# Patient Record
Sex: MALE | Race: ASIAN | URBAN - METROPOLITAN AREA
[De-identification: names, ages, dates, MRNs, and addresses within clinical notes are randomized per-mention and may not be internally consistent; named-entity substitution may affect disease eponyms.]

---

## 2022-01-31 ENCOUNTER — OFFICE VISIT (OUTPATIENT)
Dept: FAMILY MEDICINE CLINIC | Facility: CLINIC | Age: 47
End: 2022-01-31
Payer: COMMERCIAL

## 2022-01-31 VITALS
HEART RATE: 70 BPM | TEMPERATURE: 98 F | DIASTOLIC BLOOD PRESSURE: 78 MMHG | SYSTOLIC BLOOD PRESSURE: 116 MMHG | OXYGEN SATURATION: 99 % | RESPIRATION RATE: 18 BRPM

## 2022-01-31 DIAGNOSIS — G47.09 OTHER INSOMNIA: ICD-10-CM

## 2022-01-31 DIAGNOSIS — F32.A ANXIETY AND DEPRESSION: ICD-10-CM

## 2022-01-31 DIAGNOSIS — G47.33 OSA (OBSTRUCTIVE SLEEP APNEA): Primary | ICD-10-CM

## 2022-01-31 DIAGNOSIS — F41.9 ANXIETY AND DEPRESSION: ICD-10-CM

## 2022-01-31 DIAGNOSIS — L72.3 SEBACEOUS CYST: ICD-10-CM

## 2022-01-31 DIAGNOSIS — Z00.00 GENERAL MEDICAL EXAM: ICD-10-CM

## 2022-01-31 DIAGNOSIS — D17.21 LIPOMA OF RIGHT UPPER EXTREMITY: ICD-10-CM

## 2022-01-31 PROCEDURE — 3074F SYST BP LT 130 MM HG: CPT | Performed by: FAMILY MEDICINE

## 2022-01-31 PROCEDURE — 99203 OFFICE O/P NEW LOW 30 MIN: CPT | Performed by: FAMILY MEDICINE

## 2022-01-31 PROCEDURE — 3078F DIAST BP <80 MM HG: CPT | Performed by: FAMILY MEDICINE

## 2022-01-31 NOTE — PROGRESS NOTES
HPI:   Meg Naiud is a 51year old male who presents to establish     Pt has RICHARD - lost 15 lb / still not sleeping well and now using machine   + nocturia   Cannot fall asleep once he wakes up   Takes melatonin and magnesium     Pt has been feeling anxi normal mood and affect good eye contact appropriate     ASSESSMENT AND PLAN:   Indigo Emmanuel is a 51year old male who presents with     1. RICHARD (obstructive sleep apnea)    - OP REFERRAL TO DIAGNOSTIC SLEEP STUDY  - GENERAL SLEEP STUDY TRANSCRIPTION;  Sravanthi

## 2022-02-14 ENCOUNTER — OFFICE VISIT (OUTPATIENT)
Dept: SLEEP CENTER | Age: 47
End: 2022-02-14
Attending: INTERNAL MEDICINE
Payer: COMMERCIAL

## 2022-02-14 DIAGNOSIS — G47.33 OSA (OBSTRUCTIVE SLEEP APNEA): ICD-10-CM

## 2022-02-14 PROCEDURE — 95806 SLEEP STUDY UNATT&RESP EFFT: CPT

## 2022-02-15 ENCOUNTER — OFFICE VISIT (OUTPATIENT)
Dept: SLEEP CENTER | Age: 47
End: 2022-02-15
Attending: INTERNAL MEDICINE
Payer: COMMERCIAL

## 2022-02-15 PROCEDURE — 95806 SLEEP STUDY UNATT&RESP EFFT: CPT

## 2022-02-18 NOTE — PROCEDURES
[address] [address]       Accredited by the [employer] (AASM)    PATIENT'S NAME:        Russ Sultana  ATTENDING PHYSICIAN:   Kyra Arthur M.D. REFERRING PHYSICIAN:   Jose Alejandro Nolasco D.O.  PATIENT ACCOUNT #:     [de-identified]        LOCATION:       [address] [place] [address] #:      [id number]        YOB: 1975  DATE OF STUDY:         02/15/2022    SLEEP STUDY REPORT    STUDY TYPE:  Unattended sleep study. CLINICAL HISTORY:  The patient is a 56-year-old male who weighs 195 pounds and is 5 feet 9 inches tall who has a past history of sleep apnea and who has been losing weight. He returns now for re-evaluation. The Dwight Sleepiness Scale score is 6. UNATTENDED SLEEP STUDY RECORDING PARAMETERS:  The patient underwent a formal technically adequate unattended diagnostic sleep study coordinated with the [facility]. The study was performed in accordance with the AASM standard for Out of Center Sleep Testing. The four-channel Type III HST measures the following parameters:  flow, respiratory effort, pulse, and oxygen saturation. SCORING:  This study was scored in accordance with AASM scoring rules and Medicare rule 1B. POLYSOMNOGRAPHIC FINDINGS:  The total recording time was 8 hours and 46 minutes. Recording started at 10:24 p.m., recording ended at 7:10 a.m.    BODY POSITION:  The patient spent 53% of the recording time in the supine position. RESPIRATORY MEASURES:  Respiratory monitoring revealed a respiratory event index of 4.9 events per hour. Throughout the study the patient maintained an oxyhemoglobin saturation above 90%. DIAGNOSTIC CODE:  G47.10. IMPRESSION:  This study did not demonstrate any evidence of obstructive sleep apnea-hypopnea syndrome. RECOMMENDATIONS:  Further followup with the referring provider.       Dictated By Kyra Arthur M.D.  d: 02/18/2022 09:46:06  t: 02/18/2022 10:07:04  [facility] [id number]  JV/    cc:  Geetha Goins D.O.

## 2022-02-24 ENCOUNTER — LAB ENCOUNTER (OUTPATIENT)
Dept: LAB | Age: 47
End: 2022-02-24
Attending: FAMILY MEDICINE
Payer: COMMERCIAL

## 2022-02-24 DIAGNOSIS — Z00.00 GENERAL MEDICAL EXAM: ICD-10-CM

## 2022-02-24 LAB
ALBUMIN SERPL-MCNC: 4.1 G/DL (ref 3.4–5)
ALBUMIN/GLOB SERPL: 1.4 {RATIO} (ref 1–2)
ALP LIVER SERPL-CCNC: 60 U/L
ALT SERPL-CCNC: 39 U/L
ANION GAP SERPL CALC-SCNC: 2 MMOL/L (ref 0–18)
AST SERPL-CCNC: 18 U/L (ref 15–37)
BASOPHILS # BLD AUTO: 0.04 X10(3) UL (ref 0–0.2)
BASOPHILS NFR BLD AUTO: 0.6 %
BILIRUB SERPL-MCNC: 0.7 MG/DL (ref 0.1–2)
BUN BLD-MCNC: 20 MG/DL (ref 7–18)
CALCIUM BLD-MCNC: 9.6 MG/DL (ref 8.5–10.1)
CHLORIDE SERPL-SCNC: 105 MMOL/L (ref 98–112)
CHOLEST SERPL-MCNC: 201 MG/DL (ref ?–200)
CO2 SERPL-SCNC: 31 MMOL/L (ref 21–32)
COMPLEXED PSA SERPL-MCNC: 0.52 NG/ML (ref ?–4)
CREAT BLD-MCNC: 1.05 MG/DL
EOSINOPHIL # BLD AUTO: 0.1 X10(3) UL (ref 0–0.7)
EOSINOPHIL NFR BLD AUTO: 1.5 %
ERYTHROCYTE [DISTWIDTH] IN BLOOD BY AUTOMATED COUNT: 12.5 %
EST. AVERAGE GLUCOSE BLD GHB EST-MCNC: 103 MG/DL (ref 68–126)
FASTING PATIENT LIPID ANSWER: YES
FASTING STATUS PATIENT QL REPORTED: YES
GLOBULIN PLAS-MCNC: 2.9 G/DL (ref 2.8–4.4)
GLUCOSE BLD-MCNC: 91 MG/DL (ref 70–99)
HBA1C MFR BLD: 5.2 % (ref ?–5.7)
HCT VFR BLD AUTO: 46.8 %
HDLC SERPL-MCNC: 67 MG/DL (ref 40–59)
HGB BLD-MCNC: 15.4 G/DL
IMM GRANULOCYTES # BLD AUTO: 0.03 X10(3) UL (ref 0–1)
IMM GRANULOCYTES NFR BLD: 0.4 %
LDLC SERPL CALC-MCNC: 124 MG/DL (ref ?–100)
LYMPHOCYTES # BLD AUTO: 1.71 X10(3) UL (ref 1–4)
LYMPHOCYTES NFR BLD AUTO: 25.2 %
MCH RBC QN AUTO: 30 PG (ref 26–34)
MCHC RBC AUTO-ENTMCNC: 32.9 G/DL (ref 31–37)
MCV RBC AUTO: 91.1 FL
MONOCYTES # BLD AUTO: 0.43 X10(3) UL (ref 0.1–1)
MONOCYTES NFR BLD AUTO: 6.3 %
NEUTROPHILS # BLD AUTO: 4.48 X10 (3) UL (ref 1.5–7.7)
NEUTROPHILS # BLD AUTO: 4.48 X10(3) UL (ref 1.5–7.7)
NEUTROPHILS NFR BLD AUTO: 66 %
NONHDLC SERPL-MCNC: 134 MG/DL (ref ?–130)
OSMOLALITY SERPL CALC.SUM OF ELEC: 288 MOSM/KG (ref 275–295)
PLATELET # BLD AUTO: 293 10(3)UL (ref 150–450)
POTASSIUM SERPL-SCNC: 4.5 MMOL/L (ref 3.5–5.1)
PROT SERPL-MCNC: 7 G/DL (ref 6.4–8.2)
RBC # BLD AUTO: 5.14 X10(6)UL
SODIUM SERPL-SCNC: 138 MMOL/L (ref 136–145)
T4 FREE SERPL-MCNC: 1.1 NG/DL (ref 0.8–1.7)
TRIGL SERPL-MCNC: 53 MG/DL (ref 30–149)
TSI SER-ACNC: 0.71 MIU/ML (ref 0.36–3.74)
VIT B12 SERPL-MCNC: 941 PG/ML (ref 193–986)
VLDLC SERPL CALC-MCNC: 9 MG/DL (ref 0–30)
WBC # BLD AUTO: 6.8 X10(3) UL (ref 4–11)

## 2022-02-24 PROCEDURE — 84439 ASSAY OF FREE THYROXINE: CPT | Performed by: FAMILY MEDICINE

## 2022-02-24 PROCEDURE — 80050 GENERAL HEALTH PANEL: CPT | Performed by: FAMILY MEDICINE

## 2022-02-24 PROCEDURE — 83036 HEMOGLOBIN GLYCOSYLATED A1C: CPT | Performed by: FAMILY MEDICINE

## 2022-02-24 PROCEDURE — 84153 ASSAY OF PSA TOTAL: CPT | Performed by: FAMILY MEDICINE

## 2022-02-24 PROCEDURE — 82607 VITAMIN B-12: CPT | Performed by: FAMILY MEDICINE

## 2022-02-24 PROCEDURE — 80061 LIPID PANEL: CPT | Performed by: FAMILY MEDICINE

## 2022-03-01 ENCOUNTER — OFFICE VISIT (OUTPATIENT)
Dept: FAMILY MEDICINE CLINIC | Facility: CLINIC | Age: 47
End: 2022-03-01
Payer: COMMERCIAL

## 2022-03-01 VITALS
WEIGHT: 200.38 LBS | SYSTOLIC BLOOD PRESSURE: 102 MMHG | DIASTOLIC BLOOD PRESSURE: 60 MMHG | RESPIRATION RATE: 18 BRPM | OXYGEN SATURATION: 97 % | HEIGHT: 68 IN | HEART RATE: 90 BPM | BODY MASS INDEX: 30.37 KG/M2

## 2022-03-01 DIAGNOSIS — Z12.11 COLON CANCER SCREENING: ICD-10-CM

## 2022-03-01 DIAGNOSIS — Z00.00 ANNUAL PHYSICAL EXAM: Primary | ICD-10-CM

## 2022-03-01 PROCEDURE — 3074F SYST BP LT 130 MM HG: CPT | Performed by: FAMILY MEDICINE

## 2022-03-01 PROCEDURE — 3078F DIAST BP <80 MM HG: CPT | Performed by: FAMILY MEDICINE

## 2022-03-01 PROCEDURE — 99396 PREV VISIT EST AGE 40-64: CPT | Performed by: FAMILY MEDICINE

## 2022-03-01 PROCEDURE — 3008F BODY MASS INDEX DOCD: CPT | Performed by: FAMILY MEDICINE

## 2022-03-07 ENCOUNTER — ORDER TRANSCRIPTION (OUTPATIENT)
Dept: ADMINISTRATIVE | Facility: HOSPITAL | Age: 47
End: 2022-03-07

## 2022-03-07 ENCOUNTER — LAB ENCOUNTER (OUTPATIENT)
Dept: LAB | Age: 47
End: 2022-03-07
Attending: FAMILY MEDICINE
Payer: COMMERCIAL

## 2022-03-07 DIAGNOSIS — Z00.00 ANNUAL PHYSICAL EXAM: ICD-10-CM

## 2022-03-07 LAB
CRP SERPL-MCNC: <0.29 MG/DL (ref ?–0.3)
DHEA-S SERPL-MCNC: 337.5 UG/DL
TESTOST SERPL-MCNC: 404.71 NG/DL

## 2022-03-07 PROCEDURE — 84403 ASSAY OF TOTAL TESTOSTERONE: CPT | Performed by: FAMILY MEDICINE

## 2022-03-07 PROCEDURE — 84402 ASSAY OF FREE TESTOSTERONE: CPT | Performed by: FAMILY MEDICINE

## 2022-03-07 PROCEDURE — 86140 C-REACTIVE PROTEIN: CPT | Performed by: FAMILY MEDICINE

## 2022-03-07 PROCEDURE — 82627 DEHYDROEPIANDROSTERONE: CPT | Performed by: FAMILY MEDICINE

## 2022-03-08 ENCOUNTER — OFFICE VISIT (OUTPATIENT)
Dept: SURGERY | Facility: CLINIC | Age: 47
End: 2022-03-08
Payer: COMMERCIAL

## 2022-03-08 VITALS
TEMPERATURE: 98 F | BODY MASS INDEX: 29.18 KG/M2 | HEIGHT: 69 IN | DIASTOLIC BLOOD PRESSURE: 62 MMHG | WEIGHT: 197 LBS | SYSTOLIC BLOOD PRESSURE: 99 MMHG | HEART RATE: 66 BPM

## 2022-03-08 DIAGNOSIS — Z12.11 SCREEN FOR COLON CANCER: Primary | ICD-10-CM

## 2022-03-08 PROCEDURE — 3078F DIAST BP <80 MM HG: CPT | Performed by: STUDENT IN AN ORGANIZED HEALTH CARE EDUCATION/TRAINING PROGRAM

## 2022-03-08 PROCEDURE — 3008F BODY MASS INDEX DOCD: CPT | Performed by: STUDENT IN AN ORGANIZED HEALTH CARE EDUCATION/TRAINING PROGRAM

## 2022-03-08 PROCEDURE — S0285 CNSLT BEFORE SCREEN COLONOSC: HCPCS | Performed by: STUDENT IN AN ORGANIZED HEALTH CARE EDUCATION/TRAINING PROGRAM

## 2022-03-08 PROCEDURE — 3074F SYST BP LT 130 MM HG: CPT | Performed by: STUDENT IN AN ORGANIZED HEALTH CARE EDUCATION/TRAINING PROGRAM

## 2022-03-08 RX ORDER — POLYETHYLENE GLYCOL 3350, SODIUM CHLORIDE, SODIUM BICARBONATE, POTASSIUM CHLORIDE 420; 11.2; 5.72; 1.48 G/4L; G/4L; G/4L; G/4L
POWDER, FOR SOLUTION ORAL
Qty: 1 EACH | Refills: 0 | Status: SHIPPED | OUTPATIENT
Start: 2022-03-08 | End: 2022-03-17

## 2022-03-09 ENCOUNTER — TELEPHONE (OUTPATIENT)
Dept: SURGERY | Facility: CLINIC | Age: 47
End: 2022-03-09

## 2022-03-14 ENCOUNTER — LAB ENCOUNTER (OUTPATIENT)
Dept: LAB | Facility: HOSPITAL | Age: 47
End: 2022-03-14
Attending: STUDENT IN AN ORGANIZED HEALTH CARE EDUCATION/TRAINING PROGRAM
Payer: COMMERCIAL

## 2022-03-14 DIAGNOSIS — Z01.812 ENCOUNTER FOR PREPROCEDURE SCREENING LABORATORY TESTING FOR COVID-19: ICD-10-CM

## 2022-03-14 DIAGNOSIS — Z20.822 ENCOUNTER FOR PREPROCEDURE SCREENING LABORATORY TESTING FOR COVID-19: ICD-10-CM

## 2022-03-14 LAB
TESTOSTERONE TOTAL: 405.8 NG/DL
TESTOSTERONE, FREE -MS/MS: 99 PG/ML

## 2022-03-15 LAB — SARS-COV-2 RNA RESP QL NAA+PROBE: NOT DETECTED

## 2022-03-17 ENCOUNTER — ANESTHESIA EVENT (OUTPATIENT)
Dept: ENDOSCOPY | Facility: HOSPITAL | Age: 47
End: 2022-03-17
Payer: COMMERCIAL

## 2022-03-17 ENCOUNTER — HOSPITAL ENCOUNTER (OUTPATIENT)
Facility: HOSPITAL | Age: 47
Setting detail: HOSPITAL OUTPATIENT SURGERY
Discharge: HOME | End: 2022-03-17
Attending: STUDENT IN AN ORGANIZED HEALTH CARE EDUCATION/TRAINING PROGRAM | Admitting: STUDENT IN AN ORGANIZED HEALTH CARE EDUCATION/TRAINING PROGRAM
Payer: COMMERCIAL

## 2022-03-17 ENCOUNTER — ANESTHESIA (OUTPATIENT)
Dept: ENDOSCOPY | Facility: HOSPITAL | Age: 47
End: 2022-03-17
Payer: COMMERCIAL

## 2022-03-17 VITALS
TEMPERATURE: 98 F | HEART RATE: 64 BPM | DIASTOLIC BLOOD PRESSURE: 67 MMHG | BODY MASS INDEX: 29.18 KG/M2 | SYSTOLIC BLOOD PRESSURE: 114 MMHG | HEIGHT: 69 IN | OXYGEN SATURATION: 100 % | RESPIRATION RATE: 18 BRPM | WEIGHT: 197 LBS

## 2022-03-17 DIAGNOSIS — Z12.11 SCREEN FOR COLON CANCER: ICD-10-CM

## 2022-03-17 DIAGNOSIS — Z01.812 ENCOUNTER FOR PREPROCEDURE SCREENING LABORATORY TESTING FOR COVID-19: Primary | ICD-10-CM

## 2022-03-17 DIAGNOSIS — Z20.822 ENCOUNTER FOR PREPROCEDURE SCREENING LABORATORY TESTING FOR COVID-19: Primary | ICD-10-CM

## 2022-03-17 PROCEDURE — 0DJD8ZZ INSPECTION OF LOWER INTESTINAL TRACT, VIA NATURAL OR ARTIFICIAL OPENING ENDOSCOPIC: ICD-10-PCS | Performed by: STUDENT IN AN ORGANIZED HEALTH CARE EDUCATION/TRAINING PROGRAM

## 2022-03-17 RX ORDER — SODIUM CHLORIDE, SODIUM LACTATE, POTASSIUM CHLORIDE, CALCIUM CHLORIDE 600; 310; 30; 20 MG/100ML; MG/100ML; MG/100ML; MG/100ML
INJECTION, SOLUTION INTRAVENOUS CONTINUOUS
Status: DISCONTINUED | OUTPATIENT
Start: 2022-03-17 | End: 2022-03-17

## 2022-03-17 RX ORDER — NALOXONE HYDROCHLORIDE 0.4 MG/ML
80 INJECTION, SOLUTION INTRAMUSCULAR; INTRAVENOUS; SUBCUTANEOUS AS NEEDED
Status: DISCONTINUED | OUTPATIENT
Start: 2022-03-17 | End: 2022-03-17

## 2022-03-17 RX ORDER — LIDOCAINE HYDROCHLORIDE 10 MG/ML
INJECTION, SOLUTION EPIDURAL; INFILTRATION; INTRACAUDAL; PERINEURAL AS NEEDED
Status: DISCONTINUED | OUTPATIENT
Start: 2022-03-17 | End: 2022-03-17 | Stop reason: SURG

## 2022-03-17 RX ADMIN — SODIUM CHLORIDE, SODIUM LACTATE, POTASSIUM CHLORIDE, CALCIUM CHLORIDE: 600; 310; 30; 20 INJECTION, SOLUTION INTRAVENOUS at 12:52:00

## 2022-03-17 RX ADMIN — SODIUM CHLORIDE, SODIUM LACTATE, POTASSIUM CHLORIDE, CALCIUM CHLORIDE: 600; 310; 30; 20 INJECTION, SOLUTION INTRAVENOUS at 12:40:00

## 2022-03-17 RX ADMIN — LIDOCAINE HYDROCHLORIDE 25 MG: 10 INJECTION, SOLUTION EPIDURAL; INFILTRATION; INTRACAUDAL; PERINEURAL at 12:43:00

## 2022-03-17 NOTE — ANESTHESIA POSTPROCEDURE EVALUATION
[address] Patient Status:  Hospital Outpatient Surgery   Age/Gender 51year old male MRN [id number]   Location [address] [facility] 28 Attending Romel Ruiz, [place], [address] [facility] Day # 0 PCP Krysten Gaytan DO       Anesthesia Post-op Note    COLONOSCOPY    Procedure Summary     Date: 03/17/22 Room / Location: [facility] ENDOSCOPY 03 / [facility] ENDOSCOPY    Anesthesia Start: 2763 Anesthesia Stop: 7426    Procedure: COLONOSCOPY (N/A ) Diagnosis:       Screen for colon cancer      (normal)    Surgeons: Dahlia Malik DO Anesthesiologist: Dany Waite MD    Anesthesia Type: MAC ASA Status: 2          Anesthesia Type: MAC    Vitals Value Taken Time   /63 03/17/22 1306   Temp 97 03/17/22 1306   Pulse 63 03/17/22 1306   Resp 14 03/17/22 1306   SpO2 98   03/17/22 1306       Patient Location: Endoscopy    Anesthesia Type: MAC    Airway Patency: patent    Postop Pain Control: adequate    Mental Status: preanesthetic baseline    Nausea/Vomiting: none    Cardiopulmonary/Hydration status: stable euvolemic    Complications: no apparent anesthesia related complications    Postop vital signs: stable    Dental Exam: Unchanged from Preop    Patient to be discharged from PACU when criteria met.

## 2022-03-17 NOTE — OPERATIVE REPORT
General Surgery Operative Note  Nayeli Packer Location: OR   CSN [id number] MRN [id number]    7/10/1975 Age 51year old   Admission Date 3/17/2022 Operation Date 3/17/2022   Attending Physician Vicky Fatima DO Operating Physician Samir Monetsinos DO   PCP Florina Rey DO      PREOPERATIVE DIAGNOSIS: Screen for colon cancer [Z12.11]  POSTOPERATIVE DIAGNOSIS: Normal colonoscopy  PROCEDURE PERFORMED: Colonoscopy   ANESTHESIA: MAC  SUMMARY OF FINDINGS: Normal colonoscopy, small hyperplastic polyp in rectum  SPECIMENS: * No specimens in log *  ESTIMATED BLOOD LOSS: minimal  COMPLICATIONS: None apparent. INDICATION FOR PROCEDURE:   Please review the preoperative history and physical. Briefly, the patient is a 51year old male who presented to clinic as referral from PCP for screening colonoscopy. The patient was seen and evaluated by myself . The risks, benefits, and alternatives of colonoscopy with possible biopsy were explained to the patient and the family in detail, including but not limited to bleeding, perforation, etc. The patient and family voiced understanding. All of their pertinent questions were answered to their satisfaction, after which willing and informed consent to proceed with surgery was obtained. DESCRIPTION OF PROCEDURE:   After confirmation of informed consent, the patient was transferred to the endoscopy suite under the care of the surgical anesthesia team and placed in the left lateral decubitus position. MAC anesthesia was induced. A comprehensive time-out was performed. A digital rectal exam was performed with a well lubricated finger and the prostate was normal. Next, the scope was inserted and insufflation was begun. The scope was gently advanced to the cecum and the usual anatomic landmarks including the ileocecal valve and appendiceal orifice were identified. The bowel prep was [place] grade 2-3 requiring significant irrigation in the cecum.   The scope was then slowly withdrawn over the course of greater than 8 minutes and the mucosal surface examined to the best of my ability. There were no identified abnormalities with the exception of 1 small hyperplastic polyp in the rectum that was not biopsied. The scope was then retroflexed in the rectal vault and the rectal vault thoroughly examined. The rectal vault was normal without hemorrhoids. Rectal vault was then desufflated and the scope was removed. The patient was awakened from anesthesia and brought to the recovery room in stable condition, having tolerated the procedure without apparent complication. Instrument counts were correct at the end of the procedure and I was present for all portions of the procedure. DISPOSITION: The patient awakened from anesthesia and was brought to the postoperative recovery room. CONDITION: Stable, doing well. Findings were discussed with the patient's family immediately upon conclusion of the procedure. The patient is to follow up in clinic for any pathology results in > 1 week. Next colonoscopy should be in 10 years or sooner if there are any changes. Thank you for allowing me to participate in the care of this patient.     Dr. Ilir Lorenzo (STEPHEN) [facility] General Surgery  3/17/2022  1:04 PM

## 2022-03-17 NOTE — INTERVAL H&P NOTE
Pre-op Diagnosis: Screen for colon cancer [Z12.11]    The above referenced H&P was reviewed by Freya Pang DO on 3/17/2022, the patient was examined and no significant changes have occurred in the patient's condition since the H&P was performed. I discussed with the patient and/or legal representative the potential benefits, risks and side effects of this procedure including but not limited to bleeding, infection, perforation, etc.; the likelihood of the patient achieving goals; and potential problems that might occur during recuperation. I discussed reasonable alternatives to the procedure, including risks, benefits and side effects related to the alternatives and risks related to not receiving this procedure. We will proceed with colonoscopy as planned.     Dr. Suresh OJEDA) [facility] General Surgery  3/17/2022  12:38 PM

## 2022-05-19 ENCOUNTER — ORDER TRANSCRIPTION (OUTPATIENT)
Dept: ADMINISTRATIVE | Facility: HOSPITAL | Age: 47
End: 2022-05-19

## 2022-05-19 DIAGNOSIS — Z13.6 SCREENING FOR CARDIOVASCULAR CONDITION: Primary | ICD-10-CM

## 2022-05-20 ENCOUNTER — HOSPITAL ENCOUNTER (OUTPATIENT)
Dept: CT IMAGING | Facility: HOSPITAL | Age: 47
Discharge: HOME | End: 2022-05-20
Attending: FAMILY MEDICINE

## 2022-05-20 DIAGNOSIS — Z13.6 SCREENING FOR CARDIOVASCULAR CONDITION: ICD-10-CM

## 2024-07-11 ENCOUNTER — OFFICE VISIT (OUTPATIENT)
Dept: FAMILY MEDICINE CLINIC | Facility: CLINIC | Age: 49
End: 2024-07-11
Payer: COMMERCIAL

## 2024-07-11 VITALS
RESPIRATION RATE: 18 BRPM | HEIGHT: 69 IN | DIASTOLIC BLOOD PRESSURE: 80 MMHG | SYSTOLIC BLOOD PRESSURE: 118 MMHG | BODY MASS INDEX: 30.96 KG/M2 | OXYGEN SATURATION: 99 % | HEART RATE: 70 BPM | WEIGHT: 209 LBS

## 2024-07-11 DIAGNOSIS — Z00.00 ANNUAL PHYSICAL EXAM: Primary | ICD-10-CM

## 2024-07-11 PROCEDURE — 3008F BODY MASS INDEX DOCD: CPT | Performed by: FAMILY MEDICINE

## 2024-07-11 PROCEDURE — 3074F SYST BP LT 130 MM HG: CPT | Performed by: FAMILY MEDICINE

## 2024-07-11 PROCEDURE — 3079F DIAST BP 80-89 MM HG: CPT | Performed by: FAMILY MEDICINE

## 2024-07-11 PROCEDURE — 99396 PREV VISIT EST AGE 40-64: CPT | Performed by: FAMILY MEDICINE

## 2024-07-11 RX ORDER — LORAZEPAM 2 MG/1
2 TABLET ORAL NIGHTLY PRN
COMMUNITY
Start: 2024-04-09

## 2024-07-11 NOTE — PROGRESS NOTES
David Dey is a 49 year old male who presents for a complete physical exam.   HPI:   Pt complains of nothing.  +  vit D   + urinary symptoms- only 1x per night nocturia   No erectile dysfunction  No penile discharge  No snoring - normal at home sleep study   No family h/o colon or prostate cancer   c-scope - never     Through hims - on cialis / fluoxetine daily // unsure of benefit     Wt Readings from Last 6 Encounters:   07/11/24 209 lb (94.8 kg)   03/21/22 195 lb (88.5 kg)   03/17/22 197 lb (89.4 kg)   03/08/22 197 lb (89.4 kg)   03/01/22 200 lb 6.4 oz (90.9 kg)   09/19/16 213 lb (96.6 kg)     Body mass index is 30.86 kg/m².     Cholesterol, Total (mg/dL)   Date Value   02/24/2022 201 (H)     HDL Cholesterol (mg/dL)   Date Value   02/24/2022 67 (H)     LDL Cholesterol (mg/dL)   Date Value   02/24/2022 124 (H)     AST (U/L)   Date Value   02/24/2022 18     ALT (U/L)   Date Value   02/24/2022 39      Current Outpatient Medications   Medication Sig Dispense Refill    LORazepam 2 MG Oral Tab Take 1 tablet (2 mg total) by mouth nightly as needed. AT BEDTIME      TADALAFIL OR Take by mouth.      Omega-3 Fatty Acids (FISH OIL OR) Take by mouth.      MAGNESIUM OR Take by mouth.      MELATONIN OR Take by mouth.        Past Medical History:    RICHARD (obstructive sleep apnea)    2018      Past Surgical History:   Procedure Laterality Date    Colonoscopy N/A 3/17/2022    Procedure: COLONOSCOPY;  Surgeon: Jose Kirkland DO;  Location:  ENDOSCOPY      Family History   Problem Relation Age of Onset    Heart Disorder Father     Schizophrenia Sister     Cancer Maternal Grandfather         lung      Social History:  Social History     Socioeconomic History    Marital status:    Tobacco Use    Smoking status: Never    Smokeless tobacco: Never   Vaping Use    Vaping status: Never Used   Substance and Sexual Activity    Alcohol use: No    Drug use: No      Occ: . : . Children: 2  Works full time   Exercise:   3-4 times per week  Diet: healthy but some snacking at when stressed      REVIEW OF SYSTEMS:   GENERAL: feels well otherwise  SKIN: denies any unusual skin lesions  EYES:denies blurred vision or double vision  HEENT: denies nasal congestion, sinus pain or ST  LUNGS: denies shortness of breath with exertion  CARDIOVASCULAR: denies chest pain on exertion  GI: denies abdominal pain,denies heartburn  : denies nocturia or changes in stream  MUSCULOSKELETAL: denies back pain  NEURO: denies headaches  PSYCHE: denies depression or anxiety  HEMATOLOGIC: denies hx of anemia  ENDOCRINE: denies thyroid history  ALL/ASTHMA: denies hx of allergy or asthma    EXAM:   /80   Pulse 70   Resp 18   Ht 5' 9\" (1.753 m)   Wt 209 lb (94.8 kg)   SpO2 99%   BMI 30.86 kg/m²   Body mass index is 30.86 kg/m².   GENERAL: well developed, well nourished,in no apparent distress  SKIN: no rashes,no suspicious lesions  HEENT: atraumatic, normocephalic,ears and throat are clear  EYES:PERRLA, EOMI, normal optic disk,conjunctiva are clear  NECK: supple,no adenopathy,no bruits  CHEST: no chest tenderness  BREAST: no dominant or suspicious mass  LUNGS: clear to auscultation  CARDIO: RRR without murmur  GI: good BS's,no masses, HSM or tenderness  ((: two descended testes,no masses,no hernia,no penile lesions  RECTAL: good rectal tone, prostate shows no masses, stool is OB negative)) deferred   MUSCULOSKELETAL: back is not tender,FROM of the back  EXTREMITIES: no cyanosis, clubbing or edema  NEURO: Oriented times three,cranial nerves are intact,motor and sensory are grossly intact    ASSESSMENT AND PLAN:   David Dey is a 49 year old male who presents for a complete physical exam.     1. Annual physical exam    - Vitamin D [E]; Future  - Free T4, (Free Thyroxine); Future  - Assay, Thyroid Stim Hormone; Future  - Lipid Panel; Future  - CBC With Differential With Platelet; Future  - Vitamin B12; Future  - Comp Metabolic Panel (14);  Future  - Hemoglobin A1C; Future  - PSA, Total (Screening) [E]; Future  - Testosterone, Total Free, Male [E]; Future        Discussed diet, exercise, calcium, vit D and fish oil.   The patient indicates understanding of these issues and agrees to the plan.  The patient is asked to return for CPX in 1 year or sooner if needed.

## 2024-07-17 ENCOUNTER — LAB ENCOUNTER (OUTPATIENT)
Dept: LAB | Age: 49
End: 2024-07-17
Attending: FAMILY MEDICINE
Payer: COMMERCIAL

## 2024-07-17 DIAGNOSIS — Z00.00 ANNUAL PHYSICAL EXAM: ICD-10-CM

## 2024-07-17 LAB
ALBUMIN SERPL-MCNC: 4.2 G/DL (ref 3.2–4.8)
ALBUMIN/GLOB SERPL: 1.6 {RATIO} (ref 1–2)
ALP LIVER SERPL-CCNC: 59 U/L
ALT SERPL-CCNC: 37 U/L
ANION GAP SERPL CALC-SCNC: 6 MMOL/L (ref 0–18)
AST SERPL-CCNC: 25 U/L (ref ?–34)
BASOPHILS # BLD AUTO: 0.03 X10(3) UL (ref 0–0.2)
BASOPHILS NFR BLD AUTO: 0.5 %
BILIRUB SERPL-MCNC: 0.6 MG/DL (ref 0.3–1.2)
BUN BLD-MCNC: 17 MG/DL (ref 9–23)
CALCIUM BLD-MCNC: 9.2 MG/DL (ref 8.7–10.4)
CHLORIDE SERPL-SCNC: 108 MMOL/L (ref 98–112)
CHOLEST SERPL-MCNC: 188 MG/DL (ref ?–200)
CO2 SERPL-SCNC: 23 MMOL/L (ref 21–32)
COMPLEXED PSA SERPL-MCNC: 0.5 NG/ML (ref ?–4)
CREAT BLD-MCNC: 0.84 MG/DL
EGFRCR SERPLBLD CKD-EPI 2021: 106 ML/MIN/1.73M2 (ref 60–?)
EOSINOPHIL # BLD AUTO: 0.32 X10(3) UL (ref 0–0.7)
EOSINOPHIL NFR BLD AUTO: 5.5 %
ERYTHROCYTE [DISTWIDTH] IN BLOOD BY AUTOMATED COUNT: 13.1 %
EST. AVERAGE GLUCOSE BLD GHB EST-MCNC: 97 MG/DL (ref 68–126)
FASTING PATIENT LIPID ANSWER: YES
FASTING STATUS PATIENT QL REPORTED: YES
GLOBULIN PLAS-MCNC: 2.7 G/DL (ref 2.8–4.4)
GLUCOSE BLD-MCNC: 94 MG/DL (ref 70–99)
HBA1C MFR BLD: 5 % (ref ?–5.7)
HCT VFR BLD AUTO: 43.8 %
HDLC SERPL-MCNC: 54 MG/DL (ref 40–59)
HGB BLD-MCNC: 15 G/DL
IMM GRANULOCYTES # BLD AUTO: 0.01 X10(3) UL (ref 0–1)
IMM GRANULOCYTES NFR BLD: 0.2 %
LDLC SERPL CALC-MCNC: 125 MG/DL (ref ?–100)
LYMPHOCYTES # BLD AUTO: 1.53 X10(3) UL (ref 1–4)
LYMPHOCYTES NFR BLD AUTO: 26.1 %
MCH RBC QN AUTO: 30.3 PG (ref 26–34)
MCHC RBC AUTO-ENTMCNC: 34.2 G/DL (ref 31–37)
MCV RBC AUTO: 88.5 FL
MONOCYTES # BLD AUTO: 0.51 X10(3) UL (ref 0.1–1)
MONOCYTES NFR BLD AUTO: 8.7 %
NEUTROPHILS # BLD AUTO: 3.47 X10 (3) UL (ref 1.5–7.7)
NEUTROPHILS # BLD AUTO: 3.47 X10(3) UL (ref 1.5–7.7)
NEUTROPHILS NFR BLD AUTO: 59 %
NONHDLC SERPL-MCNC: 134 MG/DL (ref ?–130)
OSMOLALITY SERPL CALC.SUM OF ELEC: 285 MOSM/KG (ref 275–295)
PLATELET # BLD AUTO: 248 10(3)UL (ref 150–450)
POTASSIUM SERPL-SCNC: 4.1 MMOL/L (ref 3.5–5.1)
PROT SERPL-MCNC: 6.9 G/DL (ref 5.7–8.2)
RBC # BLD AUTO: 4.95 X10(6)UL
SODIUM SERPL-SCNC: 137 MMOL/L (ref 136–145)
T4 FREE SERPL-MCNC: 1.3 NG/DL (ref 0.8–1.7)
TRIGL SERPL-MCNC: 44 MG/DL (ref 30–149)
TSI SER-ACNC: 1.41 MIU/ML (ref 0.55–4.78)
VIT D+METAB SERPL-MCNC: 37.2 NG/ML (ref 30–100)
VLDLC SERPL CALC-MCNC: 8 MG/DL (ref 0–30)
WBC # BLD AUTO: 5.9 X10(3) UL (ref 4–11)

## 2024-07-17 PROCEDURE — 84403 ASSAY OF TOTAL TESTOSTERONE: CPT

## 2024-07-17 PROCEDURE — 82607 VITAMIN B-12: CPT

## 2024-07-17 PROCEDURE — 83036 HEMOGLOBIN GLYCOSYLATED A1C: CPT

## 2024-07-17 PROCEDURE — 84439 ASSAY OF FREE THYROXINE: CPT

## 2024-07-17 PROCEDURE — 80053 COMPREHEN METABOLIC PANEL: CPT

## 2024-07-17 PROCEDURE — 80061 LIPID PANEL: CPT

## 2024-07-17 PROCEDURE — 84443 ASSAY THYROID STIM HORMONE: CPT

## 2024-07-17 PROCEDURE — 85025 COMPLETE CBC W/AUTO DIFF WBC: CPT

## 2024-07-17 PROCEDURE — 84402 ASSAY OF FREE TESTOSTERONE: CPT

## 2024-07-17 PROCEDURE — 82306 VITAMIN D 25 HYDROXY: CPT

## 2024-07-18 LAB — VIT B12 SERPL-MCNC: 1147 PG/ML (ref 211–911)

## 2024-07-19 LAB
FREE TESTOST DIRECT: 7.3 PG/ML
TESTOSTERONE: 307 NG/DL

## 2025-02-02 ENCOUNTER — APPOINTMENT (OUTPATIENT)
Dept: CT IMAGING | Facility: HOSPITAL | Age: 50
End: 2025-02-02
Attending: EMERGENCY MEDICINE
Payer: COMMERCIAL

## 2025-02-02 ENCOUNTER — HOSPITAL ENCOUNTER (INPATIENT)
Facility: HOSPITAL | Age: 50
LOS: 1 days | Discharge: HOME | End: 2025-02-03
Attending: EMERGENCY MEDICINE | Admitting: HOSPITALIST
Payer: COMMERCIAL

## 2025-02-02 DIAGNOSIS — J18.9 COMMUNITY ACQUIRED PNEUMONIA, UNSPECIFIED LATERALITY: Primary | ICD-10-CM

## 2025-02-02 DIAGNOSIS — R09.02 HYPOXIA: ICD-10-CM

## 2025-02-02 DIAGNOSIS — J98.01 BRONCHOSPASM: ICD-10-CM

## 2025-02-02 LAB
ALBUMIN SERPL-MCNC: 4.5 G/DL (ref 3.2–4.8)
ALBUMIN/GLOB SERPL: 1.6 {RATIO} (ref 1–2)
ALP LIVER SERPL-CCNC: 55 U/L
ALT SERPL-CCNC: 60 U/L
ANION GAP SERPL CALC-SCNC: 12 MMOL/L (ref 0–18)
APTT PPP: 33.9 SECONDS (ref 23–36)
AST SERPL-CCNC: 39 U/L (ref ?–34)
BASOPHILS # BLD AUTO: 0.03 X10(3) UL (ref 0–0.2)
BASOPHILS NFR BLD AUTO: 0.3 %
BILIRUB SERPL-MCNC: 0.6 MG/DL (ref 0.3–1.2)
BUN BLD-MCNC: 14 MG/DL (ref 9–23)
CALCIUM BLD-MCNC: 9.5 MG/DL (ref 8.7–10.6)
CHLORIDE SERPL-SCNC: 99 MMOL/L (ref 98–112)
CO2 SERPL-SCNC: 25 MMOL/L (ref 21–32)
CREAT BLD-MCNC: 1.01 MG/DL
D DIMER PPP FEU-MCNC: 0.86 UG/ML FEU (ref ?–0.5)
EGFRCR SERPLBLD CKD-EPI 2021: 91 ML/MIN/1.73M2 (ref 60–?)
EOSINOPHIL # BLD AUTO: 0.09 X10(3) UL (ref 0–0.7)
EOSINOPHIL NFR BLD AUTO: 1 %
ERYTHROCYTE [DISTWIDTH] IN BLOOD BY AUTOMATED COUNT: 11.8 %
FLUAV + FLUBV RNA SPEC NAA+PROBE: NEGATIVE
FLUAV + FLUBV RNA SPEC NAA+PROBE: NEGATIVE
GLOBULIN PLAS-MCNC: 2.9 G/DL (ref 2–3.5)
GLUCOSE BLD-MCNC: 99 MG/DL (ref 70–99)
HCT VFR BLD AUTO: 42.5 %
HGB BLD-MCNC: 15.1 G/DL
IMM GRANULOCYTES # BLD AUTO: 0.03 X10(3) UL (ref 0–1)
IMM GRANULOCYTES NFR BLD: 0.3 %
INR BLD: 1.1 (ref 0.8–1.2)
LYMPHOCYTES # BLD AUTO: 1.35 X10(3) UL (ref 1–4)
LYMPHOCYTES NFR BLD AUTO: 15.6 %
MCH RBC QN AUTO: 30.1 PG (ref 26–34)
MCHC RBC AUTO-ENTMCNC: 35.5 G/DL (ref 31–37)
MCV RBC AUTO: 84.7 FL
MONOCYTES # BLD AUTO: 0.76 X10(3) UL (ref 0.1–1)
MONOCYTES NFR BLD AUTO: 8.8 %
NEUTROPHILS # BLD AUTO: 6.4 X10 (3) UL (ref 1.5–7.7)
NEUTROPHILS # BLD AUTO: 6.4 X10(3) UL (ref 1.5–7.7)
NEUTROPHILS NFR BLD AUTO: 74 %
NT-PROBNP SERPL-MCNC: <35 PG/ML (ref ?–125)
OSMOLALITY SERPL CALC.SUM OF ELEC: 283 MOSM/KG (ref 275–295)
PLATELET # BLD AUTO: 301 10(3)UL (ref 150–450)
POTASSIUM SERPL-SCNC: 4.1 MMOL/L (ref 3.5–5.1)
PROT SERPL-MCNC: 7.4 G/DL (ref 5.7–8.2)
PROTHROMBIN TIME: 14.3 SECONDS (ref 11.6–14.8)
RBC # BLD AUTO: 5.02 X10(6)UL
RSV RNA SPEC NAA+PROBE: NEGATIVE
SARS-COV-2 RNA RESP QL NAA+PROBE: NOT DETECTED
SARS-COV-2 RNA RESP QL NAA+PROBE: NOT DETECTED
SODIUM SERPL-SCNC: 136 MMOL/L (ref 136–145)
TROPONIN I SERPL HS-MCNC: 6 NG/L
WBC # BLD AUTO: 8.7 X10(3) UL (ref 4–11)

## 2025-02-02 PROCEDURE — 99223 1ST HOSP IP/OBS HIGH 75: CPT | Performed by: HOSPITALIST

## 2025-02-02 PROCEDURE — 71275 CT ANGIOGRAPHY CHEST: CPT | Performed by: EMERGENCY MEDICINE

## 2025-02-02 RX ORDER — ONDANSETRON 2 MG/ML
4 INJECTION INTRAMUSCULAR; INTRAVENOUS EVERY 6 HOURS PRN
Status: DISCONTINUED | OUTPATIENT
Start: 2025-02-02 | End: 2025-02-03

## 2025-02-02 RX ORDER — BENZONATATE 100 MG/1
200 CAPSULE ORAL 3 TIMES DAILY PRN
Status: DISCONTINUED | OUTPATIENT
Start: 2025-02-02 | End: 2025-02-03

## 2025-02-02 RX ORDER — ACETAMINOPHEN 500 MG
500 TABLET ORAL EVERY 4 HOURS PRN
Status: DISCONTINUED | OUTPATIENT
Start: 2025-02-02 | End: 2025-02-03

## 2025-02-02 RX ORDER — LORAZEPAM 0.5 MG/1
1 TABLET ORAL NIGHTLY PRN
Status: DISCONTINUED | OUTPATIENT
Start: 2025-02-02 | End: 2025-02-03

## 2025-02-02 RX ORDER — GUAIFENESIN 600 MG/1
600 TABLET, EXTENDED RELEASE ORAL 2 TIMES DAILY
Status: DISCONTINUED | OUTPATIENT
Start: 2025-02-02 | End: 2025-02-03

## 2025-02-02 RX ORDER — METHYLPREDNISOLONE SODIUM SUCCINATE 125 MG/2ML
125 INJECTION INTRAMUSCULAR; INTRAVENOUS ONCE
Status: COMPLETED | OUTPATIENT
Start: 2025-02-02 | End: 2025-02-02

## 2025-02-02 RX ORDER — IPRATROPIUM BROMIDE AND ALBUTEROL SULFATE 2.5; .5 MG/3ML; MG/3ML
3 SOLUTION RESPIRATORY (INHALATION) ONCE
Status: COMPLETED | OUTPATIENT
Start: 2025-02-02 | End: 2025-02-02

## 2025-02-02 RX ORDER — ECHINACEA PURPUREA EXTRACT 125 MG
1 TABLET ORAL
Status: DISCONTINUED | OUTPATIENT
Start: 2025-02-02 | End: 2025-02-03

## 2025-02-02 RX ORDER — AZITHROMYCIN 250 MG/1
500 TABLET, FILM COATED ORAL DAILY
Status: DISCONTINUED | OUTPATIENT
Start: 2025-02-03 | End: 2025-02-03

## 2025-02-02 RX ORDER — PROCHLORPERAZINE EDISYLATE 5 MG/ML
5 INJECTION INTRAMUSCULAR; INTRAVENOUS EVERY 8 HOURS PRN
Status: DISCONTINUED | OUTPATIENT
Start: 2025-02-02 | End: 2025-02-03

## 2025-02-02 RX ORDER — SODIUM PHOSPHATE, DIBASIC AND SODIUM PHOSPHATE, MONOBASIC 7; 19 G/230ML; G/230ML
1 ENEMA RECTAL ONCE AS NEEDED
Status: DISCONTINUED | OUTPATIENT
Start: 2025-02-02 | End: 2025-02-03

## 2025-02-02 RX ORDER — SENNOSIDES 8.6 MG
17.2 TABLET ORAL NIGHTLY PRN
Status: DISCONTINUED | OUTPATIENT
Start: 2025-02-02 | End: 2025-02-03

## 2025-02-02 RX ORDER — SODIUM CHLORIDE 9 MG/ML
INJECTION, SOLUTION INTRAVENOUS CONTINUOUS
Status: ACTIVE | OUTPATIENT
Start: 2025-02-02 | End: 2025-02-02

## 2025-02-02 RX ORDER — POLYETHYLENE GLYCOL 3350 17 G/17G
17 POWDER, FOR SOLUTION ORAL DAILY PRN
Status: DISCONTINUED | OUTPATIENT
Start: 2025-02-02 | End: 2025-02-03

## 2025-02-02 RX ORDER — BISACODYL 10 MG
10 SUPPOSITORY, RECTAL RECTAL
Status: DISCONTINUED | OUTPATIENT
Start: 2025-02-02 | End: 2025-02-03

## 2025-02-02 NOTE — ED INITIAL ASSESSMENT (HPI)
Pt here with fever, cough, chest congestion, generalized weakness ongoing x 9 days. O2 87% upon arrival.

## 2025-02-02 NOTE — ED PROVIDER NOTES
Patient Seen in: [facility] Emergency Department      History     Chief Complaint   Patient presents with    Difficulty Breathing     Stated Complaint: from [facility] for 9 days.    Subjective:   HPI      Patient is a 49-year-old male who presents emergency room with a history of fever, cough, and chest congestion as long as generalized weakness which has been ongoing for the last 9 days.  The patient's children was sick with similar symptoms at home.  The patient was at the immediate care today had a test for COVID, flu, and RSV all of which were found to be negative and a chest x-ray done which showed diffuse interstitial markings consistent with atypical pneumonia versus edema he was found to be hypoxic he was sent to the ER for further evaluation.  The patient typically is not on oxygen.  The patient states that he has been eating and drinking normally at home.  The patient has had no history of any vomiting.  The patient denies history of any other somatic complaints or discomfort at this time.  Patient has had no recent antibiotic use.    Objective:     Past Medical History:    RICHARD (obstructive sleep apnea)    2018              Past Surgical History:   Procedure Laterality Date    Colonoscopy N/A 3/17/2022    Procedure: COLONOSCOPY;  Surgeon: Jose Kirkland DO;  Location:  ENDOSCOPY                Social History     Socioeconomic History    Marital status:    Tobacco Use    Smoking status: Never    Smokeless tobacco: Never   Vaping Use    Vaping status: Never Used   Substance and Sexual Activity    Alcohol use: No    Drug use: No                  Physical Exam     ED Triage Vitals   BP 02/02/25 1438 138/82   Pulse 02/02/25 1436 114   Resp 02/02/25 1436 (!) 30   Temp 02/02/25 1438 99.4 °F (37.4 °C)   Temp src --    SpO2 02/02/25 1436 (!) 86 %   O2 Device 02/02/25 1438 Nasal cannula       Current Vitals:   Vital Signs  BP: 127/79  Pulse: 96  Resp: (!) 27  Temp: 99.4 °F (37.4 °C)  MAP (mmHg):  93    Oxygen Therapy  SpO2: 97 %  O2 Device: Nasal cannula  O2 Flow Rate (L/min): 2 L/min        Physical Exam  GENERAL: Well-developed, well-nourished male   Sitting up breathing easily in no apparent distress.  Patient is nontoxic in appearance.  HEENT: Head is normocephalic, atraumatic. Pupils are 4 mm equally round and reactive to light. Oropharynx is clear. Mucous membranes are moist.  NECK: No stridor.  LUNGS: Diminished breath sounds with expiratory wheeze in all lung fields  HEART: Regular rhythm with a mildly tachycardic rate. Normal S1, S2 no S3, or S4. No murmur.  ABDOMEN: There is no focal tenderness to palpation appreciated anywhere throughout the abdomen. There is no guarding, no rebound, no mass, and no organomegaly appreciated. There is normoactive bowel sounds.   EXTREMITIES: There is no cyanosis, clubbing, or edema appreciated. Pulses are 2+ and equal in all 4 extremities.  NEURO: Patient is awake, alert and oriented to time place and person. Motor strength is 5 over 5 in all 4 extremities. There are no gross motor or sensory deficits appreciated.  Patient answering all questions appropriately.          ED Course     Labs Reviewed   COMP METABOLIC PANEL (14) - Abnormal; Notable for the following components:       Result Value    AST 39 (*)     ALT 60 (*)     All other components within normal limits   D-DIMER - Abnormal; Notable for the following components:    D-Dimer 0.86 (*)     All other components within normal limits   TROPONIN I HIGH SENSITIVITY - Normal   PRO BETA NATRIURETIC PEPTIDE - Normal   PROTHROMBIN TIME (PT) - Normal   PTT, ACTIVATED - Normal   CBC WITH DIFFERENTIAL WITH PLATELET   RAPID SARS-COV-2 BY PCR     EKG    Rate, intervals and axes as noted on EKG Report.  Rate: 107  Rhythm: Sinus Rhythm  Reading: No acute ischemic change noted                CTA CHEST (CPT=71275)    Result Date: 2/2/2025  CONCLUSION:  1. No evidence of pulmonary embolism. 2. Multifocal nodular opacities  most pronounced in the lung bases concerning for infiltrate.    LOCATION:  [facility]   Dictated by (CST): Monroe Hansen MD on 2/02/2025 at 5:07 PM     Finalized by (CST): Monroe Hansen MD on 2/02/2025 at 5:10 PM             MDM          15:53 patient sitting back and breathing easily in no apparent distress is feeling better on repeat examination at this time.  Lungs with improved air entry bilaterally no further wheeze.  Will continue to observe at this time.  16:50 patient sitting back and breathing easily in no apparent distress this time.  Patient with no other new complaints at this time.  Patient feeling better on repeat examination.  Patient made aware of the need for CT angiogram at this time.  Will continue to observe at this time.  Admission disposition: 2/2/2025  5:22 PM           Medical Decision Making  Patient had an IV line established blood were drawn including a CBC, chemistries, BUN/creatinine, and blood sugar all of which are unremarkable.  Liver function test show mild elevation of liver transaminases.  Troponin and BNP are negative.  COVID, flu, RSV are all negative from the immediate care.  The patient did have a chest x-ray which showed diffuse interstitial infiltrates on chest x-ray.  The patient was wheezing on initial exam here in the ER patient was given albuterol Atrovent nebulizer treatment as well as IV steroids with improvement in symptoms after this was given.  Patient had no further new complaints throughout the rest emergency room stay.  The patient was found to be hypoxic here in the ER.  Differential diagnosis considered myself includes was not limited to acute pneumonia, acute pulmonary embolism.  Patient was given IV Rocephin and Zithromax after CT angiogram with results as noted above.  Patient did have elevated D-dimer.  The patient will be admitted for further treatment at this time.  Patient's case discussed with the [facility] hospitalist.  The patient was admitted for further  care at this time    Disposition and Plan     Clinical Impression:  1. Community acquired pneumonia, unspecified laterality    2. Bronchospasm    3. Hypoxia         Disposition:  Admit  2/2/2025  5:22 pm    Follow-up:  No follow-up provider specified.        Medications Prescribed:  Current Discharge Medication List              Supplementary Documentation:         Hospital Problems       Present on Admission  Date Reviewed: 7/11/2024            ICD-10-CM Noted POA    * (Principal) Community acquired pneumonia, unspecified laterality J18.9 2/2/2025 Unknown

## 2025-02-03 VITALS
HEART RATE: 77 BPM | TEMPERATURE: 98 F | WEIGHT: 203 LBS | DIASTOLIC BLOOD PRESSURE: 74 MMHG | SYSTOLIC BLOOD PRESSURE: 120 MMHG | OXYGEN SATURATION: 92 % | RESPIRATION RATE: 20 BRPM | HEIGHT: 69 IN | BODY MASS INDEX: 30.07 KG/M2

## 2025-02-03 LAB
ANION GAP SERPL CALC-SCNC: 10 MMOL/L (ref 0–18)
ATRIAL RATE: 107 BPM
BASOPHILS # BLD AUTO: 0.01 X10(3) UL (ref 0–0.2)
BASOPHILS NFR BLD AUTO: 0.1 %
BUN BLD-MCNC: 20 MG/DL (ref 9–23)
CALCIUM BLD-MCNC: 9.3 MG/DL (ref 8.7–10.6)
CHLORIDE SERPL-SCNC: 102 MMOL/L (ref 98–112)
CO2 SERPL-SCNC: 26 MMOL/L (ref 21–32)
CREAT BLD-MCNC: 0.89 MG/DL
EGFRCR SERPLBLD CKD-EPI 2021: 104 ML/MIN/1.73M2 (ref 60–?)
EOSINOPHIL # BLD AUTO: 0 X10(3) UL (ref 0–0.7)
EOSINOPHIL NFR BLD AUTO: 0 %
ERYTHROCYTE [DISTWIDTH] IN BLOOD BY AUTOMATED COUNT: 11.9 %
GLUCOSE BLD-MCNC: 150 MG/DL (ref 70–99)
HCT VFR BLD AUTO: 39 %
HGB BLD-MCNC: 13.9 G/DL
IMM GRANULOCYTES # BLD AUTO: 0.03 X10(3) UL (ref 0–1)
IMM GRANULOCYTES NFR BLD: 0.4 %
L PNEUMO AG UR QL: NEGATIVE
LYMPHOCYTES # BLD AUTO: 0.65 X10(3) UL (ref 1–4)
LYMPHOCYTES NFR BLD AUTO: 8.5 %
MAGNESIUM SERPL-MCNC: 2.2 MG/DL (ref 1.6–2.6)
MCH RBC QN AUTO: 30.5 PG (ref 26–34)
MCHC RBC AUTO-ENTMCNC: 35.6 G/DL (ref 31–37)
MCV RBC AUTO: 85.5 FL
MONOCYTES # BLD AUTO: 0.38 X10(3) UL (ref 0.1–1)
MONOCYTES NFR BLD AUTO: 5 %
NEUTROPHILS # BLD AUTO: 6.59 X10 (3) UL (ref 1.5–7.7)
NEUTROPHILS # BLD AUTO: 6.59 X10(3) UL (ref 1.5–7.7)
NEUTROPHILS NFR BLD AUTO: 86 %
OSMOLALITY SERPL CALC.SUM OF ELEC: 291 MOSM/KG (ref 275–295)
P AXIS: 47 DEGREES
P-R INTERVAL: 186 MS
PLATELET # BLD AUTO: 313 10(3)UL (ref 150–450)
POTASSIUM SERPL-SCNC: 4.3 MMOL/L (ref 3.5–5.1)
Q-T INTERVAL: 298 MS
QRS DURATION: 86 MS
QTC CALCULATION (BEZET): 397 MS
R AXIS: 2 DEGREES
RBC # BLD AUTO: 4.56 X10(6)UL
SODIUM SERPL-SCNC: 138 MMOL/L (ref 136–145)
T AXIS: 35 DEGREES
VENTRICULAR RATE: 107 BPM
WBC # BLD AUTO: 7.7 X10(3) UL (ref 4–11)

## 2025-02-03 PROCEDURE — 99239 HOSP IP/OBS DSCHRG MGMT >30: CPT | Performed by: INTERNAL MEDICINE

## 2025-02-03 RX ORDER — AZITHROMYCIN 500 MG/1
500 TABLET, FILM COATED ORAL DAILY
Qty: 2 TABLET | Refills: 0 | Status: SHIPPED | OUTPATIENT
Start: 2025-02-03 | End: 2025-02-05

## 2025-02-03 RX ORDER — ALBUTEROL SULFATE 90 UG/1
2 INHALANT RESPIRATORY (INHALATION) EVERY 6 HOURS PRN
Qty: 1 EACH | Refills: 0 | Status: SHIPPED | OUTPATIENT
Start: 2025-02-03

## 2025-02-03 RX ORDER — CEFDINIR 300 MG/1
300 CAPSULE ORAL 2 TIMES DAILY
Qty: 12 CAPSULE | Refills: 0 | Status: SHIPPED | OUTPATIENT
Start: 2025-02-03 | End: 2025-02-09

## 2025-02-03 NOTE — ED QUICK NOTES
Orders for admission, patient is aware of plan and ready to go upstairs. Any questions, please call ED RN KISHA  at extension [phone].     Patient Covid vaccination status: Fully vaccinated     COVID Test Ordered in ED: Rapid SARS-CoV-2 by PCR    COVID Suspicion at Admission: N/A    Running Infusions:  None    Mental Status/LOC at time of transport: A/O 4    Other pertinent information:   CIWA score: N/A   NIH score:  N/A

## 2025-02-03 NOTE — PLAN OF CARE
Patient is alert and orientated, VSS, RA, afebrile and does not complain of pain. Patient is ambulatory. Stable and stating in the 90's on RA. All meds given per MAR. Call light and safety precautions are in place. Plan for discharge.    Problem: Patient/Family Goals  Goal: Patient/Family Long Term Goal  Description: Patient's Long Term Goal:     Interventions:  - See additional Care Plan goals for specific interventions  Outcome: Progressing  Goal: Patient/Family Short Term Goal  Description: Patient's Short Term Goal:     Interventions:   - See additional Care Plan goals for specific interventions  Outcome: Progressing     Problem: RESPIRATORY - ADULT  Goal: Achieves optimal ventilation and oxygenation  Description: INTERVENTIONS:  - Assess for changes in respiratory status  - Assess for changes in mentation and behavior  - Position to facilitate oxygenation and minimize respiratory effort  - Oxygen supplementation based on oxygen saturation or ABGs  - Provide Smoking Cessation handout, if applicable  - Encourage broncho-pulmonary hygiene including cough, deep breathe, Incentive Spirometry  - Assess the need for suctioning and perform as needed  - Assess and instruct to report SOB or any respiratory difficulty  - Respiratory Therapy support as indicated  - Manage/alleviate anxiety  - Monitor for signs/symptoms of CO2 retention  Outcome: Progressing     Problem: RISK FOR INFECTION - ADULT  Goal: Absence of fever/infection during anticipated neutropenic period  Description: INTERVENTIONS  - Monitor WBC  - Administer growth factors as ordered  - Implement neutropenic guidelines  Outcome: Progressing     Problem: DISCHARGE PLANNING  Goal: Discharge to home or other facility with appropriate resources  Description: INTERVENTIONS:  - Identify barriers to discharge w/pt and caregiver  - Include patient/family/discharge partner in discharge planning  - Arrange for needed discharge resources and transportation as  appropriate  - Identify discharge learning needs (meds, wound care, etc)  - Arrange for interpreters to assist at discharge as needed  - Consider post-discharge preferences of patient/family/discharge partner  - Complete POLST form as appropriate  - Assess patient's ability to be responsible for managing their own health  - Refer to Case Management Department for coordinating discharge planning if the patient needs post-hospital services based on physician/LIP order or complex needs related to functional status, cognitive ability or social support system  Outcome: Progressing

## 2025-02-03 NOTE — PLAN OF CARE
Patient seen and examined. Feels much better and is on room air. Plan for discharge today on abx and PRN albuterol inhaler.    Pavan Childress, DO

## 2025-02-03 NOTE — PLAN OF CARE
Patient admitted from ER with pneumonia, patient alert and oriented x 4, lungs diminished, wheezes and crackles bilaterally, room air, abdomen soft, bowel sounds present, voids, urine sample to lab, patient aware sputum sample needed, IV site saline locked, admission navigator completed, ambulatory to bathroom independently, denies pain.

## 2025-02-03 NOTE — PAYOR COMM NOTE
--------------  ADMISSION REVIEW     Payor: [employer]  Subscriber #:  [id number]  Authorization Number: [id number]    Admit date: 2/2/25  Admit time:  8:35 PM       REVIEW DOCUMENTATION:     ED Provider Notes        Stated Complaint: from [facility] for 9 days.    Subjective:   HPI      Patient is a 49-year-old male who presents emergency room with a history of fever, cough, and chest congestion as long as generalized weakness which has been ongoing for the last 9 days.  The patient's children was sick with similar symptoms at home.  The patient was at the immediate care today had a test for COVID, flu, and RSV all of which were found to be negative and a chest x-ray done which showed diffuse interstitial markings consistent with atypical pneumonia versus edema he was found to be hypoxic he was sent to the ER for further evaluation.  The patient typically is not on oxygen.  The patient states that he has been eating and drinking normally at home.  The patient has had no history of any vomiting.  The patient denies history of any other somatic complaints or discomfort at this time.  Patient has had no recent antibiotic use.      Physical Exam     ED Triage Vitals   BP 02/02/25 1438 138/82   Pulse 02/02/25 1436 114   Resp 02/02/25 1436 (!) 30   Temp 02/02/25 1438 99.4 °F (37.4 °C)   Temp src --    SpO2 02/02/25 1436 (!) 86 %   O2 Device 02/02/25 1438 Nasal cannula       Current Vitals:   Vital Signs  BP: 127/79  Pulse: 96  Resp: (!) 27  Temp: 99.4 °F (37.4 °C)  MAP (mmHg): 93    Oxygen Therapy  SpO2: 97 %  O2 Device: Nasal cannula  O2 Flow Rate (L/min): 2 L/min        Physical Exam  GENERAL: Well-developed, well-nourished male   Sitting up breathing easily in no apparent distress.  Patient is nontoxic in appearance.  HEENT: Head is normocephalic, atraumatic. Pupils are 4 mm equally round and reactive to light. Oropharynx is clear. Mucous membranes are moist.  NECK: No stridor.  LUNGS: Diminished breath  sounds with expiratory wheeze in all lung fields  HEART: Regular rhythm with a mildly tachycardic rate. Normal S1, S2 no S3, or S4. No murmur.  ABDOMEN: There is no focal tenderness to palpation appreciated anywhere throughout the abdomen. There is no guarding, no rebound, no mass, and no organomegaly appreciated. There is normoactive bowel sounds.   EXTREMITIES: There is no cyanosis, clubbing, or edema appreciated. Pulses are 2+ and equal in all 4 extremities.  NEURO: Patient is awake, alert and oriented to time place and person. Motor strength is 5 over 5 in all 4 extremities. There are no gross motor or sensory deficits appreciated.  Patient answering all questions appropriately.          ED Course     Labs Reviewed   COMP METABOLIC PANEL (14) - Abnormal; Notable for the following components:       Result Value    AST 39 (*)     ALT 60 (*)     All other components within normal limits   D-DIMER - Abnormal; Notable for the following components:    D-Dimer 0.86 (*)     All other components within normal limits   TROPONIN I HIGH SENSITIVITY - Normal   PRO BETA NATRIURETIC PEPTIDE - Normal   PROTHROMBIN TIME (PT) - Normal   PTT, ACTIVATED - Normal   CBC WITH DIFFERENTIAL WITH PLATELET   RAPID SARS-COV-2 BY PCR     EKG    Rate, intervals and axes as noted on EKG Report.  Rate: 107  Rhythm: Sinus Rhythm  Reading: No acute ischemic change noted      CTA CHEST (CPT=71275)    Result Date: 2/2/2025  CONCLUSION:  1. No evidence of pulmonary embolism. 2. Multifocal nodular opacities most pronounced in the lung bases concerning for infiltrate.    LOCATION:  [facility]   Dictated by (CST): Monroe Hansen MD on 2/02/2025 at 5:07 PM     Finalized by (CST): Monroe Hansen MD on 2/02/2025 at 5:10 PM            MDM          15:53 patient sitting back and breathing easily in no apparent distress is feeling better on repeat examination at this time.  Lungs with improved air entry bilaterally no further wheeze.  Will continue to observe at  this time.  16:50 patient sitting back and breathing easily in no apparent distress this time.  Patient with no other new complaints at this time.  Patient feeling better on repeat examination.  Patient made aware of the need for CT angiogram at this time.  Will continue to observe at this time.  Admission disposition: 2/2/2025  5:22 PM    Medical Decision Making  Patient had an IV line established blood were drawn including a CBC, chemistries, BUN/creatinine, and blood sugar all of which are unremarkable.  Liver function test show mild elevation of liver transaminases.  Troponin and BNP are negative.  COVID, flu, RSV are all negative from the immediate care.  The patient did have a chest x-ray which showed diffuse interstitial infiltrates on chest x-ray.  The patient was wheezing on initial exam here in the ER patient was given albuterol Atrovent nebulizer treatment as well as IV steroids with improvement in symptoms after this was given.  Patient had no further new complaints throughout the rest emergency room stay.  The patient was found to be hypoxic here in the ER.  Differential diagnosis considered myself includes was not limited to acute pneumonia, acute pulmonary embolism.  Patient was given IV Rocephin and Zithromax after CT angiogram with results as noted above.  Patient did have elevated D-dimer.  The patient will be admitted for further treatment at this time.  Patient's case discussed with the [facility].  The patient was admitted for further care at this time    Disposition and Plan     Clinical Impression:  1. Community acquired pneumonia, unspecified laterality    2. Bronchospasm    3. Hypoxia         Disposition:  Admit  2/2/2025  5:22 pm        Supplementary Documentation:         Hospital Problems       Present on Admission  Date Reviewed: 7/11/2024            ICD-10-CM Noted POA    * (Principal) Community acquired pneumonia, unspecified laterality J18.9 2/2/2025 Unknown            History  and Physical            David Dey Patient Status:  Emergency    7/10/1975 MRN [id number]   [facility] EMERGENCY DEPARTMENT Attending Dru Christine MD   Hosp Day # 0 PCP Rossana Owens DO      Chief Complaint: SOB, cough         Subjective:  History of Present Illness:      David Dey is a 49 year old male with no previous past medical history presents emergency department with shortness of breath and cough.  Patient been having symptoms for the last 9 days.  Symptoms including chest congestion, cough, fevers, generalized weakness.  He was trying over-the-counter medications with out improvement.  He went to an urgent care who sent her to the hospital.  On arrival he was 87% on room air.  Currently denies any chest pain, no nausea vomiting, no diarrhea.  He has no other complaints at this time.        Assessment & Plan:  #Community acquired pneumonia  #Acute hypoxic respiratory failure   Sepsis given , RR 30  CTA chest neg for PE, noted b/l opacities   EKG sinus tachy  Rocephin and azithro  F/u cultures  Flu, covid neg  Symptomatic management      #Elevated Liver enzymes   Mild, repeat cmp in am     #RICHARD              Plan of care discussed with patient, er physician, nurse      Vazquez Uribe MD    MEDICATIONS ADMINISTERED IN LAST 1 DAY:  azithromycin (Zithromax) 500 mg in sodium chloride 0.9% 250mL IVPB premix       Date Action Dose Route User    2025 1757 New Bag 500 mg Intravenous Elly Ta RN          cefTRIAXone (Rocephin) 2 g in sodium chloride 0.9% 100 mL IVPB-ADDV       Date Action Dose Route User    2025 1731 New Bag 2 g Intravenous Elly Ta RN          guaiFENesin ER (Mucinex) 12 hr tab 600 mg       Date Action Dose Route User    2/3/2025 0940 Given 600 mg Oral Rizwana Barrera RN    2025 2220 Given 600 mg Oral Mery Mohr RN          iopamidol 76% (ISOVUE-370) injection for power injector       Date Action Dose Route User    2025  1659 Given 100 mL Intravenous Magolan Parul A          ipratropium-albuterol (Duoneb) 0.5-2.5 (3) MG/3ML inhalation solution 3 mL       Date Action Dose Route User    2/2/2025 1511 Given 3 mL Nebulization Fabienne Manley, RCP          melatonin tab 3 mg       Date Action Dose Route User    2/2/2025 2223 Given 3 mg Oral Mery Mohr, RN          methylPREDNISolone sodium succinate (Solu-MEDROL) injection 125 mg       Date Action Dose Route User    2/2/2025 1531 Given 125 mg Intravenous Elly Ta, RN            Vitals (last day)       Date/Time Temp Pulse Resp BP SpO2 Weight O2 Device O2 Flow Rate (L/min) [facility]    02/03/25 1220 98.1 °F (36.7 °C) 77 20 120/74 92 % -- None (Room air) --     02/03/25 0745 97.7 °F (36.5 °C) 81 20 118/77 92 % -- None (Room air) --     02/03/25 0413 97.7 °F (36.5 °C) 82 22 123/74 92 % -- None (Room air) --     02/02/25 2232 -- -- -- -- -- 203 lb (92.1 kg) -- --     02/02/25 2043 98.5 °F (36.9 °C) 87 22 126/77 92 % -- None (Room air) --     02/02/25 1930 -- 91 31 121/79 98 % -- Nasal cannula 2 L/min     02/02/25 1900 -- 98 36 128/83 98 % -- Nasal cannula 2 L/min PS    02/02/25 1800 -- 92 32 127/71 95 % -- Nasal cannula 2 L/min PS    02/02/25 1730 -- 96 27 127/79 97 % -- Nasal cannula 2 L/min     02/02/25 1534 -- -- -- -- -- -- Nasal cannula 4 L/min     02/02/25 1438 99.4 °F (37.4 °C) -- -- 138/82 92 % -- Nasal cannula 4 L/min     02/02/25 1436 -- 114 30 -- 86 % 203 lb (92.1 kg) -- -- MU

## 2025-02-03 NOTE — CM/SW NOTE
SW received a home health protocol order d/t pneumonia diagnosis. SW and RN discussed patient during morning rounds. At this time, there are no identified discharge planning needs per nursing.    Please place consult for social work if any needs are determined.     SW will continue to follow for plan of care changes and remain available for any additional DC needs or concerns.     Jojo Parry MSW, LSW  Discharge Planner   [phone]

## 2025-02-03 NOTE — DISCHARGE SUMMARY
[facility]     David Dey Patient Status:  Inpatient    7/10/1975 MRN [id number]   Location [facility] 4NW-A Attending No att. providers found   Hosp Day # 1 PCP Rossana Owens DO     Date of Admission: 2025  Date of Discharge: 2/3/2025  Discharge Disposition: Home or Self Care    Discharge Diagnosis:   #Community acquired pneumonia  #Elevated Liver enzymes, mild  #RICHARD    History of Present Illness: David Dey is a 49 year old male with no previous past medical history presents emergency department with shortness of breath and cough.  Patient been having symptoms for the last 9 days.  Symptoms including chest congestion, cough, fevers, generalized weakness.  He was trying over-the-counter medications with out improvement.  He went to an urgent care who sent her to the hospital.  On arrival he was 87% on room air.  Currently denies any chest pain, no nausea vomiting, no diarrhea.  He has no other complaints at this time.     Brief Synopsis: Patient was quickly weaned to room air. Felt much better the next day and was discharged on PO abx. LFTs mildly elevated and he will f/u with PCP for repeat labs.     Lace+ Score: 26  59-90 High Risk  29-58 Medium Risk  0-28   Low Risk       TCM Follow-Up Recommendation:  LACE < 29: Low Risk of readmission after discharge from the hospital. No TCM follow-up needed.    Procedures during hospitalization:   None    Incidental or significant findings and recommendations (brief descriptions):  None     Lab/Test results pending at Discharge:   None    Consultants:  None    Discharge Medication List:     Discharge Medications        START taking these medications        Instructions Prescription details   albuterol 108 (90 Base) MCG/ACT Aers  Commonly known as: Ventolin HFA      Inhale 2 puffs into the lungs every 6 (six) hours as needed for Wheezing or Shortness of Breath.   Quantity: 1 each  Refills: 0     azithromycin 500 MG Tabs  Commonly  known as: ZITHROMAX      Take 1 tablet (500 mg total) by mouth daily for 2 days.   Stop taking on: February 5, 2025  Quantity: 2 tablet  Refills: 0     cefdinir 300 MG Caps  Commonly known as: Omnicef      Take 1 capsule (300 mg total) by mouth 2 (two) times daily for 6 days.   Stop taking on: February 9, 2025  Quantity: 12 capsule  Refills: 0            CONTINUE taking these medications        Instructions Prescription details   FISH OIL OR      Take 1 tablet by mouth daily.   Refills: 0     LORazepam 2 MG Tabs  Commonly known as: Ativan      Take 1 tablet (2 mg total) by mouth nightly as needed. AT BEDTIME   Refills: 0     MAGNESIUM OR      Take by mouth daily as needed.   Refills: 0     MELATONIN OR      Take 1 tablet by mouth daily as needed.   Refills: 0     TADALAFIL OR      Take 1 tablet by mouth as needed.   Refills: 0               Where to Get Your Medications        These medications were sent to [id number] - [place], [place] - [facility] [address], [phone], [phone]  [facility] [address], [facility] [address]      Phone: [phone]   albuterol 108 (90 Base) MCG/ACT Aers  azithromycin 500 MG Tabs  cefdinir 300 MG Caps         [place] No [place] substances prescribed at discharge.    Follow-up appointment:   Rossana Owens DO  [address] [place] [address]  [facility] [address] [phone]    Follow up in 1 week(s)  Post hospital follow up and repeat labs with CBC and CMP.    Appointments for Next 30 Days 2/3/2025 - 3/5/2025      None            Vital signs:  Temp:  [97.7 °F (36.5 °C)-98.5 °F (36.9 °C)] 98.1 °F (36.7 °C)  Pulse:  [77-98] 77  Resp:  [20-36] 20  BP: (118-128)/(71-83) 120/74  SpO2:  [92 %-98 %] 92 %    Physical Exam:    General: No acute distress. Awake and alert  Respiratory: Mild end expiratory wheezing improving after coughing. No rhonchi.  Cardiovascular: S1, S2. Regular rate and rhythm. No murmurs, rubs or gallops.   Abdomen:  Soft, nontender, nondistended.  Positive bowel sounds. No rebound or guarding.  Musculoskeletal: Moves all extremities.  Extremities: No edema.  -----------------------------------------------------------------------------------------------  PATIENT DISCHARGE INSTRUCTIONS: See electronic chart    Pavan Childress DO    Time spent:  31 minutes

## 2025-02-03 NOTE — H&P
[facility]     David Dey Patient Status:  Emergency    7/10/1975 MRN [id number]   Location [facility] EMERGENCY DEPARTMENT Attending Dru Christine MD   Hosp Day # 0 PCP Rossana Owens DO     Chief Complaint: SOB, cough     Subjective:    History of Present Illness:     David Dey is a 49 year old male with no previous past medical history presents emergency department with shortness of breath and cough.  Patient been having symptoms for the last 9 days.  Symptoms including chest congestion, cough, fevers, generalized weakness.  He was trying over-the-counter medications with out improvement.  He went to an urgent care who sent her to the hospital.  On arrival he was 87% on room air.  Currently denies any chest pain, no nausea vomiting, no diarrhea.  He has no other complaints at this time.    History/Other:    Past Medical History:  Past Medical History:    RICHARD (obstructive sleep apnea)         Past Surgical History:   Past Surgical History:   Procedure Laterality Date    Colonoscopy N/A 3/17/2022    Procedure: COLONOSCOPY;  Surgeon: Jose Kirkland DO;  Location:  ENDOSCOPY      Family History:   Family History   Problem Relation Age of Onset    Heart Disorder Father     Schizophrenia Sister     Cancer Maternal Grandfather         lung     Social History:    reports that he has never smoked. He has never used smokeless tobacco. He reports that he does not drink alcohol and does not use drugs.     Allergies: Allergies[1]    Medications:  Medications Ordered Prior to Encounter[2]    Review of Systems:   A comprehensive review of systems was completed.    Pertinent positives and negatives noted in the HPI.    Objective:   Physical Exam:    /79   Pulse 96   Temp 99.4 °F (37.4 °C)   Resp (!) 27   Ht 5' 9\" (1.753 m)   Wt 203 lb (92.1 kg)   SpO2 97%   BMI 29.98 kg/m²   General: No acute distress, Alert  Respiratory: Rhonchi b/l, worse on right  lung  Cardiovascular: S1, S2. Regular rate and rhythm  Abdomen: Soft, Non-tender, non-distended, positive bowel sounds  Neuro: No new focal deficits  Extremities: No edema    Results:    Labs:      Labs Last 24 Hours:    Recent Labs   Lab 02/02/25  1527   RBC 5.02   HGB 15.1   HCT 42.5   MCV 84.7   MCH 30.1   MCHC 35.5   RDW 11.8   NEPRELIM 6.40   WBC 8.7   .0       Recent Labs   Lab 02/02/25  1527   GLU 99   BUN 14   CREATSERUM 1.01   EGFRCR 91   CA 9.5   ALB 4.5      K 4.1   CL 99   CO2 25.0   ALKPHO 55   AST 39*   ALT 60*   BILT 0.6   TP 7.4       Estimated Glomerular Filtration Rate: 90.6 mL/min/1.73m2 (by CKD-EPI based on SCr of 1.01 mg/dL).    Lab Results   Component Value Date    INR 1.10 02/02/2025       Recent Labs   Lab 02/02/25  1527   TROPHS 6       Recent Labs   Lab 02/02/25  1527   PBNP <35       No results for input(s): \"PCT\" in the last 168 hours.    Imaging: Imaging data reviewed in Epic.    Assessment & Plan:      #Community acquired pneumonia  #Acute hypoxic respiratory failure   Sepsis given , RR 30  CTA chest neg for PE, noted b/l opacities   EKG sinus tachy  Rocephin and azithro  F/u cultures  Flu, covid neg  Symptomatic management     #Elevated Liver enzymes   Mild, repeat cmp in am    #RICHARD          Plan of care discussed with patient, er physician, nurse     Vazquez Uribe MD    Supplementary Documentation:     The 21st Century Cures Act makes medical notes like these available to patients in the interest of transparency. Please be advised this is a medical document. Medical documents are intended to carry relevant information, facts as evident, and the clinical opinion of the practitioner. The medical note is intended as peer to peer communication and may appear blunt or direct. It is written in medical language and may contain abbreviations or verbiage that are unfamiliar.                                       [1] No Known Allergies  [2]   No current facility-administered  medications on file prior to encounter.     Current Outpatient Medications on File Prior to Encounter   Medication Sig Dispense Refill    LORazepam 2 MG Oral Tab Take 1 tablet (2 mg total) by mouth nightly as needed. AT BEDTIME      TADALAFIL OR Take by mouth.      Omega-3 Fatty Acids (FISH OIL OR) Take by mouth.      MAGNESIUM OR Take by mouth.      MELATONIN OR Take by mouth.

## 2025-02-04 ENCOUNTER — PATIENT OUTREACH (OUTPATIENT)
Dept: CASE MANAGEMENT | Age: 50
End: 2025-02-04

## 2025-02-04 NOTE — PROGRESS NOTES
TCM Request   Hospital Follow up for PCP (Discharge2/3 edw)     PCP   Rossana Owens DO   [facility]  [address] [place] [id number]  [phone]  Attempt #1:  Left message on voicemail for patient to call transitions specialist back to schedule follow up appointments. Provided Transitions specialist scheduling phone number [phone].

## 2025-02-04 NOTE — PAYOR COMM NOTE
--------------  CONTINUED STAY REVIEW    Payor: [employer] OUT OF STATE PPO  Subscriber #:  [id number]  Authorization Number: [id number]    Admit date: 2/2/25  Admit time:  8:35 PM    MEDICATIONS ADMINISTERED IN LAST 1 DAY:  guaiFENesin ER (Mucinex) 12 hr tab 600 mg       Date Action Dose Route User    Discharged on 2/3/2025    2/3/2025 0940 Given 600 mg Oral Rizwana Barrera RN            Vitals (last day) before discharge       Date/Time Temp Pulse Resp BP SpO2 Weight O2 Device O2 Flow Rate (L/min) [facility]    02/03/25 1220 98.1 °F (36.7 °C) 77 20 120/74 92 % -- None (Room air) --     02/03/25 0745 97.7 °F (36.5 °C) 81 20 118/77 92 % -- None (Room air) --     02/03/25 0413 97.7 °F (36.5 °C) 82 22 123/74 92 % -- None (Room air) --     02/02/25 2232 -- -- -- -- -- 203 lb (92.1 kg) -- -- EC    02/02/25 2043 98.5 °F (36.9 °C) 87 22 126/77 92 % -- None (Room air) --     02/02/25 1930 -- 91 31 121/79 98 % -- Nasal cannula 2 L/min     02/02/25 1900 -- 98 36 128/83 98 % -- Nasal cannula 2 L/min PS    02/02/25 1800 -- 92 32 127/71 95 % -- Nasal cannula 2 L/min PS    02/02/25 1730 -- 96 27 127/79 97 % -- Nasal cannula 2 L/min PS    02/02/25 1534 -- -- -- -- -- -- Nasal cannula 4 L/min PS    02/02/25 1438 99.4 °F (37.4 °C) -- -- 138/82 92 % -- Nasal cannula 4 L/min     02/02/25 1436 -- 114 30 -- 86 % 203 lb (92.1 kg) -- -- MU     --------------  DISCHARGE REVIEW    Payor: [facility] OUT OF STATE PPO  Subscriber #:  [id number]  Authorization Number: [id number]    Admit date: 2/2/25  Admit time:   8:35 PM  Discharge Date: 2/3/2025  2:10 PM     Admitting Physician: Pratik Tinoco MD  Attending Physician:  No att. providers found  Primary Care Physician: Rossana Owens DO          Discharge Summary Notes        Discharge Summary signed by Pavan Childress DO at 2/3/2025  4:17 PM       Author: Pavan Childress DO Specialty: HOSPITALIST, Internal Medicine Author Type: Physician    Filed: 2/3/2025  4:17 PM Date of Service:  2/3/2025  4:13 PM Status: Signed    : Pavan Childress DO (Physician)           [facility]     David Dey Patient Status:  Inpatient    7/10/1975 MRN [id number]   Location [facility] 4NW-A Attending No att. providers found   Hosp Day # 1 PCP Rossana Owens DO     Date of Admission: 2025  Date of Discharge: 2/3/2025  Discharge Disposition: Home or Self Care    Discharge Diagnosis:   #Community acquired pneumonia  #Elevated Liver enzymes, mild  #RICHARD    History of Present Illness: David Dey is a 49 year old male with no previous past medical history presents emergency department with shortness of breath and cough.  Patient been having symptoms for the last 9 days.  Symptoms including chest congestion, cough, fevers, generalized weakness.  He was trying over-the-counter medications with out improvement.  He went to an urgent care who sent her to the hospital.  On arrival he was 87% on room air.  Currently denies any chest pain, no nausea vomiting, no diarrhea.  He has no other complaints at this time.     Brief Synopsis: Patient was quickly weaned to room air. Felt much better the next day and was discharged on PO abx. LFTs mildly elevated and he will f/u with PCP for repeat labs.     Lace+ Score: 26  59-90 High Risk  29-58 Medium Risk  0-28   Low Risk       TCM Follow-Up Recommendation:  LACE < 29: Low Risk of readmission after discharge from the hospital. No TCM follow-up needed.    Procedures during hospitalization:   None    Incidental or significant findings and recommendations (brief descriptions):  None     Lab/Test results pending at Discharge:   None    Consultants:  None    Discharge Medication List:     Discharge Medications        START taking these medications        Instructions Prescription details   albuterol 108 (90 Base) MCG/ACT Aers  Commonly known as: Ventolin HFA      Inhale 2 puffs into the lungs every 6 (six) hours as needed for Wheezing or Shortness  of Breath.   Quantity: 1 each  Refills: 0     azithromycin 500 MG Tabs  Commonly known as: ZITHROMAX      Take 1 tablet (500 mg total) by mouth daily for 2 days.   Stop taking on: February 5, 2025  Quantity: 2 tablet  Refills: 0     cefdinir 300 MG Caps  Commonly known as: Omnicef      Take 1 capsule (300 mg total) by mouth 2 (two) times daily for 6 days.   Stop taking on: February 9, 2025  Quantity: 12 capsule  Refills: 0            CONTINUE taking these medications        Instructions Prescription details   FISH OIL OR      Take 1 tablet by mouth daily.   Refills: 0     LORazepam 2 MG Tabs  Commonly known as: Ativan      Take 1 tablet (2 mg total) by mouth nightly as needed. AT BEDTIME   Refills: 0     MAGNESIUM OR      Take by mouth daily as needed.   Refills: 0     MELATONIN OR      Take 1 tablet by mouth daily as needed.   Refills: 0     TADALAFIL OR      Take 1 tablet by mouth as needed.   Refills: 0               Where to Get Your Medications        These medications were sent to [id number] - [place], [place] - [address], [phone], [phone]  [facility] [address], [facility] [address]      Phone: [phone]   albuterol 108 (90 Base) MCG/ACT Aers  azithromycin 500 MG Tabs  cefdinir 300 MG Caps         [place] No controlled substances prescribed at discharge.    Follow-up appointment:   Rossana Owens DO  [address] [place] [address] [address]  [facility] [address] [phone]    Follow up in 1 week(s)  Post hospital follow up and repeat labs with CBC and CMP.    Appointments for Next 30 Days 2/3/2025 - 3/5/2025      None            Vital signs:  Temp:  [97.7 °F (36.5 °C)-98.5 °F (36.9 °C)] 98.1 °F (36.7 °C)  Pulse:  [77-98] 77  Resp:  [20-36] 20  BP: (118-128)/(71-83) 120/74  SpO2:  [92 %-98 %] 92 %    Physical Exam:    General: No acute distress. Awake and alert  Respiratory: Mild end expiratory wheezing improving after coughing. No  rhonchi.  Cardiovascular: S1, S2. Regular rate and rhythm. No murmurs, rubs or gallops.   Abdomen: Soft, nontender, nondistended.  Positive bowel sounds. No rebound or guarding.  Musculoskeletal: Moves all extremities.  Extremities: No edema.  -----------------------------------------------------------------------------------------------  PATIENT DISCHARGE INSTRUCTIONS: See electronic chart    Pavan Childress DO    Time spent:  31 minutes    Electronically signed by Pavan Childress DO on 2/3/2025  4:17 PM         REVIEWER COMMENTS

## 2025-02-05 NOTE — PROGRESS NOTES
TCM Request   Hospital Follow up for PCP (Discharge2/3 edw)     PCP   Rossana Owens DO   [facility]  [address] [place] [address]  [phone]  Appt made for  2/6 9:45  Confirmed with pt   Closing encounter

## 2025-02-06 ENCOUNTER — OFFICE VISIT (OUTPATIENT)
Dept: FAMILY MEDICINE CLINIC | Facility: CLINIC | Age: 50
End: 2025-02-06
Payer: COMMERCIAL

## 2025-02-06 VITALS
OXYGEN SATURATION: 95 % | WEIGHT: 201 LBS | BODY MASS INDEX: 29.77 KG/M2 | HEIGHT: 69 IN | TEMPERATURE: 99 F | RESPIRATION RATE: 16 BRPM | DIASTOLIC BLOOD PRESSURE: 74 MMHG | HEART RATE: 85 BPM | SYSTOLIC BLOOD PRESSURE: 126 MMHG

## 2025-02-06 DIAGNOSIS — J18.9 COMMUNITY ACQUIRED PNEUMONIA, UNSPECIFIED LATERALITY: Primary | ICD-10-CM

## 2025-02-06 PROCEDURE — 99214 OFFICE O/P EST MOD 30 MIN: CPT | Performed by: FAMILY MEDICINE

## 2025-02-06 PROCEDURE — 3078F DIAST BP <80 MM HG: CPT | Performed by: FAMILY MEDICINE

## 2025-02-06 PROCEDURE — 3074F SYST BP LT 130 MM HG: CPT | Performed by: FAMILY MEDICINE

## 2025-02-06 PROCEDURE — 3008F BODY MASS INDEX DOCD: CPT | Performed by: FAMILY MEDICINE

## 2025-02-06 NOTE — PROGRESS NOTES
NURSING DISCHARGE NOTE    Discharged Home via Ambulatory.  Accompanied by Support staff  Belongings Taken by patient/family.    Removed IV, tolerated well. Discharge papers given and all questions were answered. All belongings were taken.  
Provider Clarification     Additional information on the status of the infection.    Sepsis ruled out.    This note is part of the patient's medical record.  
Provider Clarification    Additional information on the patient's respiratory status    Acute hypoxic respiratory failure ruled out.    This note is part of the patient's medical record.    
[phone]

## 2025-02-06 NOTE — PROGRESS NOTES
Subjective:   David Dey is a 49 year old male who presents for hospital follow up.   Pt was admitted with pneumonia   Pt is feeling better   Still coughing   No fever     History/Other:   Current Medications:  Medication Reconciliation:  I am aware of an inpatient discharge within the last 30 days.  The discharge medication list  been reconciled with the patient's current medication list and reviewed by me. See medication list for additions of new medication, and changes to current doses of medications and discontinued medications.  Outpatient Medications Marked as Taking for the 2/6/25 encounter (Office Visit) with Rossana Owens, DO   Medication Sig    cefdinir 300 MG Oral Cap Take 1 capsule (300 mg total) by mouth 2 (two) times daily for 6 days.    albuterol 108 (90 Base) MCG/ACT Inhalation Aero Soln Inhale 2 puffs into the lungs every 6 (six) hours as needed for Wheezing or Shortness of Breath.    LORazepam 2 MG Oral Tab Take 1 tablet (2 mg total) by mouth nightly as needed. AT BEDTIME    TADALAFIL OR Take 1 tablet by mouth as needed.    Omega-3 Fatty Acids (FISH OIL OR) Take 1 tablet by mouth daily.    MELATONIN OR Take 1 tablet by mouth daily as needed.       Review of Systems:  GENERAL: weight stable, energy stable, no sweating  SKIN: denies any unusual skin lesions  EYES: denies blurred vision or double vision  HEENT: denies nasal congestion, sinus pain or ST  LUNGS: denies shortness of breath with exertion  CARDIOVASCULAR: denies chest pain on exertion or palpitations  GI: denies abdominal pain, denies heartburn, denies diarrhea  MUSCULOSKELETAL: denies pain, normal range of motion of extremities  NEURO: denies headaches, denies dizziness, denies weakness  PSYCHE: denies depression or anxiety  HEMATOLOGIC: denies hx of anemia, or bruising, denies bleeding  ENDOCRINE: denies thyroid history  ALL/ASTHMA: denies hx of allergy or asthma    Objective:   No LMP for male patient.  Estimated body mass index is  29.68 kg/m² as calculated from the following:    Height as of this encounter: 5' 9\" (1.753 m).    Weight as of this encounter: 201 lb (91.2 kg).   /74   Pulse 85   Temp 98.6 °F (37 °C) (Oral)   Resp 16   Ht 5' 9\" (1.753 m)   Wt 201 lb (91.2 kg)   SpO2 95%   BMI 29.68 kg/m²    GENERAL: well developed, well nourished, in no apparent distress  SKIN: no rashes, no suspicious lesions  HEENT: atraumatic, normocephalic, ears and throat are clear  EYES: PERRLA, EOMI, conjunctiva are clear  NECK: supple, no adenopathy, no bruits  CHEST: no chest tenderness  LUNGS: right lung clear, left lung diffuse crackles   CARDIO: RRR without murmur  EXTREMITIES: no cyanosis, clubbing or edema  NEURO: Oriented times three, cranial nerves are intact, motor and sensory are grossly intact    Assessment & Plan:   1. Community acquired pneumonia, unspecified laterality (Primary)  -     XR CHEST PA + LAT CHEST (CPT=71046); Future; Expected date: 02/06/2025        RTC one week or sooner if needed

## 2025-02-13 ENCOUNTER — OFFICE VISIT (OUTPATIENT)
Dept: FAMILY MEDICINE CLINIC | Facility: CLINIC | Age: 50
End: 2025-02-13
Payer: COMMERCIAL

## 2025-02-13 VITALS
OXYGEN SATURATION: 96 % | HEIGHT: 69 IN | SYSTOLIC BLOOD PRESSURE: 120 MMHG | WEIGHT: 200 LBS | DIASTOLIC BLOOD PRESSURE: 82 MMHG | HEART RATE: 83 BPM | RESPIRATION RATE: 16 BRPM | BODY MASS INDEX: 29.62 KG/M2 | TEMPERATURE: 99 F

## 2025-02-13 DIAGNOSIS — Z76.89 ENCOUNTER FOR WEIGHT MANAGEMENT: ICD-10-CM

## 2025-02-13 DIAGNOSIS — H66.90 EAR INFECTION: ICD-10-CM

## 2025-02-13 DIAGNOSIS — J18.9 COMMUNITY ACQUIRED PNEUMONIA, UNSPECIFIED LATERALITY: Primary | ICD-10-CM

## 2025-02-13 PROCEDURE — 3074F SYST BP LT 130 MM HG: CPT | Performed by: FAMILY MEDICINE

## 2025-02-13 PROCEDURE — 3079F DIAST BP 80-89 MM HG: CPT | Performed by: FAMILY MEDICINE

## 2025-02-13 PROCEDURE — 3008F BODY MASS INDEX DOCD: CPT | Performed by: FAMILY MEDICINE

## 2025-02-13 PROCEDURE — 99214 OFFICE O/P EST MOD 30 MIN: CPT | Performed by: FAMILY MEDICINE

## 2025-02-13 RX ORDER — FLUTICASONE PROPIONATE 50 MCG
2 SPRAY, SUSPENSION (ML) NASAL NIGHTLY
Qty: 3 EACH | Refills: 0 | Status: SHIPPED | OUTPATIENT
Start: 2025-02-13

## 2025-02-13 NOTE — PROGRESS NOTES
Subjective:   David Dey is a 49 year old male who presents for pneumonia and weight loss follow up     Pt was admitted with pneumonia     Abn exam last week   Now done with abx  Less coughing  No fever  Right ear pain and right sinus fullness persists    Albuterol helping   No other otc meds     Pt wants to start weight loss meds   Discussed contraindications and SE profile     History/Other:   Current Medications:  Outpatient Medications Marked as Taking for the 2/13/25 encounter (Office Visit) with Rossana Owens,    Medication Sig    albuterol 108 (90 Base) MCG/ACT Inhalation Aero Soln Inhale 2 puffs into the lungs every 6 (six) hours as needed for Wheezing or Shortness of Breath.    LORazepam 2 MG Oral Tab Take 1 tablet (2 mg total) by mouth nightly as needed. AT BEDTIME    TADALAFIL OR Take 1 tablet by mouth as needed.    Omega-3 Fatty Acids (FISH OIL OR) Take 1 tablet by mouth daily.    MELATONIN OR Take 1 tablet by mouth daily as needed.       Review of Systems:  GENERAL: weight stable, energy stable, no sweating  SKIN: denies any unusual skin lesions  EYES: denies blurred vision or double vision  HEENT: denies nasal congestion, sinus pain or ST  LUNGS: denies shortness of breath with exertion  CARDIOVASCULAR: denies chest pain on exertion or palpitations  GI: denies abdominal pain, denies heartburn, denies diarrhea  MUSCULOSKELETAL: denies pain, normal range of motion of extremities  NEURO: denies headaches, denies dizziness, denies weakness  PSYCHE: denies depression or anxiety  HEMATOLOGIC: denies hx of anemia, or bruising, denies bleeding  ENDOCRINE: denies thyroid history  ALL/ASTHMA: denies hx of allergy or asthma    Objective:   No LMP for male patient.  Estimated body mass index is 29.53 kg/m² as calculated from the following:    Height as of this encounter: 5' 9\" (1.753 m).    Weight as of this encounter: 200 lb (90.7 kg).   /82   Pulse 83   Temp 98.6 °F (37 °C) (Oral)   Resp 16   Ht  5' 9\" (1.753 m)   Wt 200 lb (90.7 kg)   SpO2 96%   BMI 29.53 kg/m²    GENERAL: well developed, well nourished, in no apparent distress  SKIN: no rashes, no suspicious lesions  HEENT: atraumatic, normocephalic, TM's - right TM  EYES: PERRLA, EOMI, conjunctiva are clear  NECK: supple, no adenopathy, no bruits  CHEST: no chest tenderness  LUNGS: clear   CARDIO: RRR without murmur  EXTREMITIES: no cyanosis, clubbing or edema  NEURO: Oriented times three, cranial nerves are intact, motor and sensory are grossly intact    Assessment & Plan:     1. Community acquired pneumonia, unspecified laterality  Clinically resolved / chest x-ray in 3 weeks     2. Ear infection  With probiotic   - amoxicillin clavulanate 875-125 MG Oral Tab; Take 1 tablet by mouth 2 (two) times daily for 10 days.  Dispense: 20 tablet; Refill: 0  - fluticasone propionate 50 MCG/ACT Nasal Suspension; 2 sprays by Nasal route nightly.  Dispense: 3 each; Refill: 0    3. Encounter for weight management  Trial of meds     RTC 1 month or sooner if needed

## 2025-02-20 ENCOUNTER — APPOINTMENT (OUTPATIENT)
Dept: GENERAL RADIOLOGY | Facility: HOSPITAL | Age: 50
End: 2025-02-20
Attending: EMERGENCY MEDICINE
Payer: COMMERCIAL

## 2025-02-20 ENCOUNTER — HOSPITAL ENCOUNTER (EMERGENCY)
Facility: HOSPITAL | Age: 50
Discharge: HOME | End: 2025-02-20
Attending: EMERGENCY MEDICINE
Payer: COMMERCIAL

## 2025-02-20 VITALS
RESPIRATION RATE: 20 BRPM | HEIGHT: 69 IN | WEIGHT: 199 LBS | TEMPERATURE: 98 F | HEART RATE: 97 BPM | BODY MASS INDEX: 29.47 KG/M2 | OXYGEN SATURATION: 100 % | SYSTOLIC BLOOD PRESSURE: 149 MMHG | DIASTOLIC BLOOD PRESSURE: 95 MMHG

## 2025-02-20 DIAGNOSIS — I49.3 PVC (PREMATURE VENTRICULAR CONTRACTION): Primary | ICD-10-CM

## 2025-02-20 LAB
ALBUMIN SERPL-MCNC: 4.9 G/DL (ref 3.2–4.8)
ALBUMIN/GLOB SERPL: 1.9 {RATIO} (ref 1–2)
ALP LIVER SERPL-CCNC: 63 U/L
ALT SERPL-CCNC: 42 U/L
ANION GAP SERPL CALC-SCNC: 10 MMOL/L (ref 0–18)
AST SERPL-CCNC: 25 U/L (ref ?–34)
BASOPHILS # BLD AUTO: 0.04 X10(3) UL (ref 0–0.2)
BASOPHILS NFR BLD AUTO: 0.4 %
BILIRUB SERPL-MCNC: 0.5 MG/DL (ref 0.3–1.2)
BUN BLD-MCNC: 14 MG/DL (ref 9–23)
CALCIUM BLD-MCNC: 10.2 MG/DL (ref 8.7–10.6)
CHLORIDE SERPL-SCNC: 105 MMOL/L (ref 98–112)
CO2 SERPL-SCNC: 26 MMOL/L (ref 21–32)
CREAT BLD-MCNC: 1.15 MG/DL
EGFRCR SERPLBLD CKD-EPI 2021: 78 ML/MIN/1.73M2 (ref 60–?)
EOSINOPHIL # BLD AUTO: 0.16 X10(3) UL (ref 0–0.7)
EOSINOPHIL NFR BLD AUTO: 1.6 %
ERYTHROCYTE [DISTWIDTH] IN BLOOD BY AUTOMATED COUNT: 12.6 %
GLOBULIN PLAS-MCNC: 2.6 G/DL (ref 2–3.5)
GLUCOSE BLD-MCNC: 110 MG/DL (ref 70–99)
HCT VFR BLD AUTO: 44.4 %
HGB BLD-MCNC: 15.5 G/DL
IMM GRANULOCYTES # BLD AUTO: 0.03 X10(3) UL (ref 0–1)
IMM GRANULOCYTES NFR BLD: 0.3 %
LYMPHOCYTES # BLD AUTO: 1.82 X10(3) UL (ref 1–4)
LYMPHOCYTES NFR BLD AUTO: 18.1 %
MAGNESIUM SERPL-MCNC: 2 MG/DL (ref 1.6–2.6)
MCH RBC QN AUTO: 30.6 PG (ref 26–34)
MCHC RBC AUTO-ENTMCNC: 34.9 G/DL (ref 31–37)
MCV RBC AUTO: 87.6 FL
MONOCYTES # BLD AUTO: 0.59 X10(3) UL (ref 0.1–1)
MONOCYTES NFR BLD AUTO: 5.9 %
NEUTROPHILS # BLD AUTO: 7.43 X10 (3) UL (ref 1.5–7.7)
NEUTROPHILS # BLD AUTO: 7.43 X10(3) UL (ref 1.5–7.7)
NEUTROPHILS NFR BLD AUTO: 73.7 %
OSMOLALITY SERPL CALC.SUM OF ELEC: 293 MOSM/KG (ref 275–295)
PLATELET # BLD AUTO: 314 10(3)UL (ref 150–450)
POTASSIUM SERPL-SCNC: 3.8 MMOL/L (ref 3.5–5.1)
PROT SERPL-MCNC: 7.5 G/DL (ref 5.7–8.2)
RBC # BLD AUTO: 5.07 X10(6)UL
SODIUM SERPL-SCNC: 141 MMOL/L (ref 136–145)
TROPONIN I SERPL HS-MCNC: 9 NG/L
WBC # BLD AUTO: 10.1 X10(3) UL (ref 4–11)

## 2025-02-20 PROCEDURE — 96360 HYDRATION IV INFUSION INIT: CPT

## 2025-02-20 PROCEDURE — 71045 X-RAY EXAM CHEST 1 VIEW: CPT | Performed by: EMERGENCY MEDICINE

## 2025-02-20 PROCEDURE — 85025 COMPLETE CBC W/AUTO DIFF WBC: CPT | Performed by: EMERGENCY MEDICINE

## 2025-02-20 PROCEDURE — 83735 ASSAY OF MAGNESIUM: CPT | Performed by: EMERGENCY MEDICINE

## 2025-02-20 PROCEDURE — 80053 COMPREHEN METABOLIC PANEL: CPT | Performed by: EMERGENCY MEDICINE

## 2025-02-20 PROCEDURE — 84484 ASSAY OF TROPONIN QUANT: CPT | Performed by: EMERGENCY MEDICINE

## 2025-02-20 PROCEDURE — 93005 ELECTROCARDIOGRAM TRACING: CPT

## 2025-02-20 PROCEDURE — 99285 EMERGENCY DEPT VISIT HI MDM: CPT

## 2025-02-20 PROCEDURE — 99284 EMERGENCY DEPT VISIT MOD MDM: CPT

## 2025-02-20 PROCEDURE — 93010 ELECTROCARDIOGRAM REPORT: CPT

## 2025-02-20 NOTE — CM/SW NOTE
[facility]den requested transportation assistance for patient that has no money and no means to go home to address listed on his face sheet.    Courtesy [employer] ride arranged, ETA 13 minutes, white [employer] prius driven by [employer].    Ride details given to HEIKE Blake.

## 2025-02-20 NOTE — ED INITIAL ASSESSMENT (HPI)
Palpitations and heart racing since 5pm yesterday. States unable to sleep. Denies SOB. Denies dizziness/headaches.

## 2025-02-20 NOTE — DISCHARGE INSTRUCTIONS
Call [phone] to schedule Holter monitor and then follow-up with cardiology after you return it    Monitor your blood pressure 3 times daily and bring your numbers into cardiologist to follow them up

## 2025-02-20 NOTE — ED PROVIDER NOTES
Patient Seen in: [facility] Emergency Department      History     Chief Complaint   Patient presents with    Arrythmia/Palpitations     Stated Complaint: onsomnia    Subjective:   HPI      Patient is a 49-year-old male presents to ED for evaluation of palpitations.  Patient states he started having an elevated heart rate tonight around 5 PM.  Does not know how fast it was at home.  He states that is not skipping.  Denies chest pain or shortness of breath.  Patient states he was treated for pneumonia couple weeks ago and then was placed on Augmentin for otitis.  Patient denies recent surgery, travel or trauma.  No history of PE or DVT.  No history of cardiac arrhythmia.  Denies vomiting or diarrhea.  Drug use.    Objective:     Past Medical History:    RICHARD (obstructive sleep apnea)    2018              Past Surgical History:   Procedure Laterality Date    Colonoscopy N/A 3/17/2022    Procedure: COLONOSCOPY;  Surgeon: Jose Kirkland DO;  Location:  ENDOSCOPY                Social History     Socioeconomic History    Marital status:    Tobacco Use    Smoking status: Never    Smokeless tobacco: Never   Vaping Use    Vaping status: Never Used   Substance and Sexual Activity    Alcohol use: No    Drug use: No     Social Drivers of Health     Food Insecurity: No Food Insecurity (2/2/2025)    Food Insecurity     Food Insecurity: Never true   Transportation Needs: No Transportation Needs (2/2/2025)    Transportation Needs     Lack of Transportation: No   Housing Stability: Low Risk  (2/2/2025)    Housing Stability     Housing Instability: No                  Physical Exam     ED Triage Vitals [02/20/25 0156]   BP (!) 172/97   Pulse 102   Resp 18   Temp 97.6 °F (36.4 °C)   Temp src Oral   SpO2 99 %   O2 Device None (Room air)       Current Vitals:   Vital Signs  BP: (!) 149/95  Pulse: 97  Resp: 20  Temp: 97.6 °F (36.4 °C)  Temp src: Oral  MAP (mmHg): (!) 111    Oxygen Therapy  SpO2: 100 %  O2 Device:  None (Room air)        Physical Exam  GENERAL: No acute distress, well appearing and non-toxic, Alert and oriented X 3   HEENT: Normocephalic, atraumatic.  Moist mucous membranes.  Pupils equal round reactive to light and accommodation, extraocular motion is intact, sclerae white, conjunctiva is pink.  Oropharynx is unremarkable, no exudate.  NECK: Supple, trachea midline, no lymphadenopathy.   LUNG: Lungs clear to auscultation bilaterally, no wheezing, no rales, no rhonchi.  CARDIOVASCULAR: Regular rate and rhythm.  Normal S1S2.  No S3S4 or murmur.  ABDOMEN: Bowel sounds are present. Soft. nondistended, no pulsatile masses. nontender  MUSCULOSKELETAL: No calf tenderness.  Dorsalis and Posterior Tibial pulses present. No clubbing. No cyanosis.  No edema.   SKIN EXAMINATIoN: Warm and dry with normal appearance.  No rashes or lesions.  NEUROLOGICAL:  Motor strength intact all groups.  normal sensation, speech intact    ED Course     Labs Reviewed   COMP METABOLIC PANEL (14) - Abnormal; Notable for the following components:       Result Value    Glucose 110 (*)     Albumin 4.9 (*)     All other components within normal limits   TROPONIN I HIGH SENSITIVITY - Normal   MAGNESIUM - Normal   CBC WITH DIFFERENTIAL WITH PLATELET   RAINBOW DRAW BLUE     EKG    Rate, intervals and axes as noted on EKG Report.  Rate: 105  Rhythm: Sinus Rhythm  Reading: Frequent PVCs.  No acute changes                Medications   sodium chloride 0.9 % IV bolus 1,000 mL (0 mL Intravenous Stopped 2/20/25 5587)     I personally reviewed xray films of chest and independent interpretation shows no evidence for pneumonia.  I also reviewed formal xray report as read by radiology with findings below:    Xray of chest read by vision rad radiology shows no evidence for acute process     MDM      Patient is a 49-year-old male presents to ED for evaluation of palpitations.  Differential cardiac arrhythmia, tachycardia, PVCs, electrolyte abnormality.   Patient underwent cardiac workup.  PVCs noted on monitor somewhat frequent.  Laboratory testing unremarkable.  Magnesium normal.  Chest x-ray negative.  Negative metabolic workup.  Heart rate in the 90s prior to discharge.  No thromboembolic risk factors.  No chest pain or shortness of breath.  Patient was written for a Holter monitor advised to follow-up with cardiographics after to evaluate frequency of PVCs.  To delineate any other potential arrhythmia.  Will hold off on beta-blockers at this time.  His blood pressure was elevated here blood pressure was (!) 149/95 prior to discharge.  Recommend he keep a log of his blood pressure 3 times daily and bring his numbers into cardiology office in 1 week when he follows up.    Patient was screened and evaluated during this visit.   As a treating physician attending to the patient, I determined, within reasonable clinical confidence and prior to discharge, that an emergency medical condition was not or was no longer present.  There was no indication for further evaluation, treatment or admission on an emergency basis.  Comprehensive verbal and written discharge and follow-up instructions were provided to help prevent relapse or worsening.  Patient was instructed to follow-up with her primary care provider for further evaluation and treatment, but to return immediately to the ER for worsening, concerning, new, changing or persisting symptoms.  I discussed the case with the patient and they had no questions, complaints, or concerns.  Patient felt comfortable going home.        Medical Decision Making      Disposition and Plan     Clinical Impression:  1. PVC (premature ventricular contraction)    2.  Elevated blood pressure    Disposition:  Discharge  2/20/2025  3:25 am    Follow-up:  Rossana Owens DO  [address] [facility] [address]  [facility] [address]  [phone]    Follow up  As needed    Luis Felipe Alcala MD  [address] [facility] [address]  [facility]  [address]  [phone]    Follow up in 1 week(s)            Medications Prescribed:  Discharge Medication List as of 2/20/2025  3:29 AM              Supplementary Documentation:

## 2025-02-21 LAB
ATRIAL RATE: 105 BPM
P AXIS: 49 DEGREES
P-R INTERVAL: 228 MS
Q-T INTERVAL: 310 MS
QRS DURATION: 86 MS
QTC CALCULATION (BEZET): 409 MS
R AXIS: 4 DEGREES
T AXIS: 41 DEGREES
VENTRICULAR RATE: 105 BPM

## 2025-03-03 ENCOUNTER — HOSPITAL ENCOUNTER (OUTPATIENT)
Dept: GENERAL RADIOLOGY | Age: 50
Discharge: HOME | End: 2025-03-03
Attending: FAMILY MEDICINE
Payer: COMMERCIAL

## 2025-03-03 ENCOUNTER — TELEPHONE (OUTPATIENT)
Dept: FAMILY MEDICINE CLINIC | Facility: CLINIC | Age: 50
End: 2025-03-03

## 2025-03-03 ENCOUNTER — HOSPITAL ENCOUNTER (OUTPATIENT)
Dept: CT IMAGING | Facility: HOSPITAL | Age: 50
Discharge: HOME | End: 2025-03-03
Attending: FAMILY MEDICINE
Payer: COMMERCIAL

## 2025-03-03 DIAGNOSIS — J18.9 COMMUNITY ACQUIRED PNEUMONIA, UNSPECIFIED LATERALITY: ICD-10-CM

## 2025-03-03 DIAGNOSIS — J18.9 COMMUNITY ACQUIRED PNEUMONIA, UNSPECIFIED LATERALITY: Primary | ICD-10-CM

## 2025-03-03 DIAGNOSIS — R05.9 COUGH, UNSPECIFIED TYPE: ICD-10-CM

## 2025-03-03 PROCEDURE — 71046 X-RAY EXAM CHEST 2 VIEWS: CPT | Performed by: FAMILY MEDICINE

## 2025-03-03 PROCEDURE — 71260 CT THORAX DX C+: CPT | Performed by: FAMILY MEDICINE

## 2025-03-03 NOTE — TELEPHONE ENCOUNTER
Spoke to pt, still coughing a little   Anxiety/palpations  ER 2/28/25 still anxiety, tried xanax 0.5 mg twice  Pt cancelled work trip since not feeling himself   Compound injection last dose last Saturday     Saw cardiology last week, they advised 7 days holter monitor and 2D echo will get next week, [facility]      Future Appointments   Date Time Provider Department Center   3/4/2025 12:15 PM Rossana Owens, DO [facility] [address] [address]   3/13/2025  7:15 AM Rossana Owens, DO [facility] [address] [address]

## 2025-03-03 NOTE — TELEPHONE ENCOUNTER
Donovan from Radiology called for STAT chest xray results     \"Borderline heart size.  Increase in vascular congestion and nonspecific interstitial opacities.  There may be subtle retrocardiac left lower lobe atelectasis or developing consolidation.  No pleural effusion hyperinflation or pneumothorax.    Continued clinical and x-ray follow-up advised.\"    Please advise

## 2025-03-03 NOTE — TELEPHONE ENCOUNTER
Pt's last dose of injectable was last Saturday  Has appt 3/4/25 with Dr. Owens    Stat CT ordered- spoke to pt, notified.

## 2025-03-04 ENCOUNTER — OFFICE VISIT (OUTPATIENT)
Dept: FAMILY MEDICINE CLINIC | Facility: CLINIC | Age: 50
End: 2025-03-04
Payer: COMMERCIAL

## 2025-03-04 VITALS
WEIGHT: 203 LBS | HEIGHT: 69 IN | OXYGEN SATURATION: 98 % | SYSTOLIC BLOOD PRESSURE: 112 MMHG | BODY MASS INDEX: 30.07 KG/M2 | DIASTOLIC BLOOD PRESSURE: 66 MMHG | HEART RATE: 69 BPM | RESPIRATION RATE: 16 BRPM

## 2025-03-04 DIAGNOSIS — J18.9 COMMUNITY ACQUIRED PNEUMONIA, UNSPECIFIED LATERALITY: ICD-10-CM

## 2025-03-04 DIAGNOSIS — F41.9 ANXIETY: ICD-10-CM

## 2025-03-04 DIAGNOSIS — R47.81 SLURRED SPEECH: Primary | ICD-10-CM

## 2025-03-04 DIAGNOSIS — Z76.89 ENCOUNTER FOR WEIGHT MANAGEMENT: ICD-10-CM

## 2025-03-04 PROCEDURE — 99214 OFFICE O/P EST MOD 30 MIN: CPT | Performed by: FAMILY MEDICINE

## 2025-03-04 PROCEDURE — 3074F SYST BP LT 130 MM HG: CPT | Performed by: FAMILY MEDICINE

## 2025-03-04 PROCEDURE — 3078F DIAST BP <80 MM HG: CPT | Performed by: FAMILY MEDICINE

## 2025-03-04 PROCEDURE — 3008F BODY MASS INDEX DOCD: CPT | Performed by: FAMILY MEDICINE

## 2025-03-04 RX ORDER — MULTIVITAMIN
1 TABLET ORAL DAILY
COMMUNITY

## 2025-03-04 RX ORDER — PROPRANOLOL HCL 20 MG
20 TABLET ORAL 2 TIMES DAILY
COMMUNITY
Start: 2025-02-26

## 2025-03-04 NOTE — PROGRESS NOTES
Subjective:   David Dey is a 49 year old male who presents for pneumonia and weight loss and anxiety follow up     Pt was admitted with pneumonia  Now done with abx  Less coughing  No fever  Repeat chest x-ray revealed ? Worse pneuemonia   Stat CT discussed     Pt started weight loss drugs 2 weeks ago   Reduced appetite     Stuttering of speech not recently   ? Slurred speech   No focal weakness     Pt was in ER 2/20 and 2/28  S/p cards eval - will do holter and echo   Dad has CHF  Mom parkinsons  No other focal neuro symptoms     Pt has been anxious   Xanax helped him sleep   Pt has not tried the propranolol thus far        History/Other:   Current Medications:  Outpatient Medications Marked as Taking for the 3/4/25 encounter (Office Visit) with Rossana Owens, DO   Medication Sig    propranolol 20 MG Oral Tab Take 1 tablet (20 mg total) by mouth 2 (two) times daily.    VITAMIN D-VITAMIN K OR Take 5,000 mg by mouth.    Multiple Vitamin (MULTI-VITAMIN) Oral Tab Take 1 tablet by mouth daily.    CUSTOM MEDICATION 20 mg. PQQ    ALPRAZolam 0.5 MG Oral Tab Take 1 tablet (0.5 mg total) by mouth 3 (three) times daily as needed for Anxiety.    albuterol 108 (90 Base) MCG/ACT Inhalation Aero Soln Inhale 2 puffs into the lungs every 6 (six) hours as needed for Wheezing or Shortness of Breath.    LORazepam 2 MG Oral Tab Take 1 tablet (2 mg total) by mouth nightly as needed. AT BEDTIME    TADALAFIL OR Take 1 tablet by mouth as needed.    Omega-3 Fatty Acids (FISH OIL OR) Take 4,000 mg by mouth daily. 400    MELATONIN OR Take 1 tablet by mouth daily as needed.       Review of Systems:  GENERAL: weight stable, energy stable, no sweating  SKIN: denies any unusual skin lesions  EYES: denies blurred vision or double vision  HEENT: denies nasal congestion, sinus pain or ST  LUNGS: denies shortness of breath with exertion  CARDIOVASCULAR: denies chest pain on exertion or palpitations  GI: denies abdominal pain, denies heartburn,  denies diarrhea  MUSCULOSKELETAL: denies pain, normal range of motion of extremities  NEURO: denies headaches, denies dizziness, denies weakness  PSYCHE: denies depression or anxiety  HEMATOLOGIC: denies hx of anemia, or bruising, denies bleeding  ENDOCRINE: denies thyroid history  ALL/ASTHMA: denies hx of allergy or asthma    Objective:   No LMP for male patient.  Estimated body mass index is 29.98 kg/m² as calculated from the following:    Height as of this encounter: 5' 9\" (1.753 m).    Weight as of this encounter: 203 lb (92.1 kg).   /66   Pulse 69   Resp 16   Ht 5' 9\" (1.753 m)   Wt 203 lb (92.1 kg)   SpO2 98%   BMI 29.98 kg/m²    GENERAL: well developed, well nourished, in no apparent distress  SKIN: no rashes, no suspicious lesions  HEENT: atraumatic, normocephalic, TM's - clear, OP clear, no sinus pain   EYES: PERRLA, EOMI, conjunctiva are clear  NECK: supple, no adenopathy, no bruits  CHEST: no chest tenderness  LUNGS: clear   CARDIO: RRR without murmur  EXTREMITIES: no cyanosis, clubbing or edema  NEURO: Oriented times three, cranial nerves are intact, motor and sensory are grossly intact    Assessment & Plan:     1. Slurred speech  To ER for any acute changes   - z [facility] MRI BRAIN (W+WO) (CPT=70553); Future  - z [facility] MRI BRAIN (W+WO) (CPT=70553)    2. Anxiety  Meds discussed     3. Community acquired pneumonia, unspecified laterality  Improving    4. Encounter for weight management  Hold weight loss meds for now     Wife present     RTC 2 weeks  or sooner if needed

## 2025-03-13 ENCOUNTER — OFFICE VISIT (OUTPATIENT)
Dept: FAMILY MEDICINE CLINIC | Facility: CLINIC | Age: 50
End: 2025-03-13
Payer: COMMERCIAL

## 2025-03-13 VITALS
TEMPERATURE: 99 F | HEART RATE: 90 BPM | SYSTOLIC BLOOD PRESSURE: 114 MMHG | RESPIRATION RATE: 16 BRPM | WEIGHT: 211 LBS | DIASTOLIC BLOOD PRESSURE: 70 MMHG | HEIGHT: 69 IN | OXYGEN SATURATION: 98 % | BODY MASS INDEX: 31.25 KG/M2

## 2025-03-13 DIAGNOSIS — F41.9 ANXIETY: ICD-10-CM

## 2025-03-13 DIAGNOSIS — R53.81 MALAISE: ICD-10-CM

## 2025-03-13 DIAGNOSIS — J18.9 PNEUMONIA DUE TO INFECTIOUS ORGANISM, UNSPECIFIED LATERALITY, UNSPECIFIED PART OF LUNG: ICD-10-CM

## 2025-03-13 DIAGNOSIS — R06.83 SNORING: Primary | ICD-10-CM

## 2025-03-13 PROCEDURE — 3074F SYST BP LT 130 MM HG: CPT | Performed by: FAMILY MEDICINE

## 2025-03-13 PROCEDURE — 3078F DIAST BP <80 MM HG: CPT | Performed by: FAMILY MEDICINE

## 2025-03-13 PROCEDURE — 99214 OFFICE O/P EST MOD 30 MIN: CPT | Performed by: FAMILY MEDICINE

## 2025-03-13 PROCEDURE — 3008F BODY MASS INDEX DOCD: CPT | Performed by: FAMILY MEDICINE

## 2025-03-13 RX ORDER — ALPRAZOLAM 0.5 MG
0.5 TABLET ORAL NIGHTLY PRN
COMMUNITY
End: 2025-03-13

## 2025-03-13 RX ORDER — ALPRAZOLAM 0.5 MG
0.5 TABLET ORAL NIGHTLY PRN
Qty: 30 TABLET | Refills: 0 | Status: SHIPPED | OUTPATIENT
Start: 2025-03-13

## 2025-03-13 NOTE — PROGRESS NOTES
Subjective:   David Dey is a 49 year old male who presents for pneumonia and weight loss and anxiety follow up     Pt was admitted with pneumonia  Pt feels healthy again   Overall feels well   Stopped the ozempic   Pt has been eating well and was advised to not do his Latter-day fast for health reasons     Anxiety is better now that he is sleeping better   Not further speech concerns   Discussed MRI   Xanax has helped him sleep   No SI or HI     Will complete the cardiac work up          History/Other:   Current Medications:  Outpatient Medications Marked as Taking for the 3/13/25 encounter (Office Visit) with Rossana Owens, DO   Medication Sig    ALPRAZolam 0.5 MG Oral Tab Take 1 tablet (0.5 mg total) by mouth nightly as needed.    propranolol 20 MG Oral Tab Take 1 tablet (20 mg total) by mouth 2 (two) times daily.    VITAMIN D-VITAMIN K OR Take 5,000 mg by mouth.    Multiple Vitamin (MULTI-VITAMIN) Oral Tab Take 1 tablet by mouth daily.    CUSTOM MEDICATION 20 mg. PQQ    albuterol 108 (90 Base) MCG/ACT Inhalation Aero Soln Inhale 2 puffs into the lungs every 6 (six) hours as needed for Wheezing or Shortness of Breath.    TADALAFIL OR Take 1 tablet by mouth as needed.    Omega-3 Fatty Acids (FISH OIL OR) Take 4,000 mg by mouth daily. 400    MELATONIN OR Take 1 tablet by mouth daily as needed.       Review of Systems:  GENERAL: weight stable, energy stable, no sweating  SKIN: denies any unusual skin lesions  EYES: denies blurred vision or double vision  HEENT: denies nasal congestion, sinus pain or ST  LUNGS: denies shortness of breath with exertion  CARDIOVASCULAR: denies chest pain on exertion or palpitations  GI: denies abdominal pain, denies heartburn, denies diarrhea  MUSCULOSKELETAL: denies pain, normal range of motion of extremities  NEURO: denies headaches, denies dizziness, denies weakness  PSYCHE: denies depression or anxiety  HEMATOLOGIC: denies hx of anemia, or bruising, denies bleeding  ENDOCRINE:  denies thyroid history  ALL/ASTHMA: denies hx of allergy or asthma    Objective:   No LMP for male patient.  Estimated body mass index is 31.16 kg/m² as calculated from the following:    Height as of this encounter: 5' 9\" (1.753 m).    Weight as of this encounter: 211 lb (95.7 kg).   /70   Pulse 90   Temp 98.6 °F (37 °C) (Oral)   Resp 16   Ht 5' 9\" (1.753 m)   Wt 211 lb (95.7 kg)   SpO2 98%   BMI 31.16 kg/m²    GENERAL: well developed, well nourished, in no apparent distress  SKIN: no rashes, no suspicious lesions  HEENT: atraumatic, normocephalic, TM's - clear, OP clear, no sinus pain   EYES: PERRLA, EOMI, conjunctiva are clear  NECK: supple, no adenopathy, no bruits  CHEST: no chest tenderness  LUNGS: clear   CARDIO: RRR without murmur  EXTREMITIES: no cyanosis, clubbing or edema  NEURO: Oriented times three, cranial nerves are intact, motor and sensory are grossly intact    Assessment & Plan:     1. Snoring    - OP REFERRAL TO DIAGNOSTIC SLEEP STUDY    2. Anxiety    - ALPRAZolam 0.5 MG Oral Tab; Take 1 tablet (0.5 mg total) by mouth nightly as needed.  Dispense: 30 tablet; Refill: 0    3. Malaise  Feeling better overall / advised against fasting / no ozempic for a few weeks     4. Pneumonia due to infectious organism, unspecified laterality, unspecified part of lung  Clinically resolved       RTC 1-2 mo  or sooner if needed

## 2025-04-28 ENCOUNTER — OFFICE VISIT (OUTPATIENT)
Dept: SLEEP CENTER | Age: 50
End: 2025-04-28
Attending: Other
Payer: COMMERCIAL

## 2025-04-28 DIAGNOSIS — R06.83 SNORING: Primary | ICD-10-CM

## 2025-04-28 PROCEDURE — 95806 SLEEP STUDY UNATT&RESP EFFT: CPT

## 2025-05-02 ENCOUNTER — SLEEP STUDY (OUTPATIENT)
Facility: CLINIC | Age: 50
End: 2025-05-02
Payer: COMMERCIAL

## 2025-05-02 DIAGNOSIS — G47.9 SLEEP DISORDER: Primary | ICD-10-CM

## 2025-05-02 PROCEDURE — 95806 SLEEP STUDY UNATT&RESP EFFT: CPT | Performed by: OTHER

## 2025-05-06 ENCOUNTER — TELEPHONE (OUTPATIENT)
Dept: FAMILY MEDICINE CLINIC | Facility: CLINIC | Age: 50
End: 2025-05-06

## 2025-08-04 ENCOUNTER — ORDER TRANSCRIPTION (OUTPATIENT)
Dept: ADMINISTRATIVE | Facility: HOSPITAL | Age: 50
End: 2025-08-04

## 2025-08-04 ENCOUNTER — OFFICE VISIT (OUTPATIENT)
Dept: FAMILY MEDICINE CLINIC | Facility: CLINIC | Age: 50
End: 2025-08-04

## 2025-08-04 VITALS
SYSTOLIC BLOOD PRESSURE: 114 MMHG | OXYGEN SATURATION: 96 % | BODY MASS INDEX: 30.66 KG/M2 | RESPIRATION RATE: 16 BRPM | HEIGHT: 69 IN | DIASTOLIC BLOOD PRESSURE: 72 MMHG | WEIGHT: 207 LBS | HEART RATE: 80 BPM

## 2025-08-04 DIAGNOSIS — Z13.6 SCREENING FOR CARDIOVASCULAR CONDITION: Primary | ICD-10-CM

## 2025-08-04 DIAGNOSIS — R06.83 SNORING: ICD-10-CM

## 2025-08-04 DIAGNOSIS — Z82.49 FAMILY HISTORY OF EARLY CAD: ICD-10-CM

## 2025-08-04 DIAGNOSIS — Z00.00 ANNUAL PHYSICAL EXAM: Primary | ICD-10-CM

## 2025-08-08 ENCOUNTER — LAB ENCOUNTER (OUTPATIENT)
Dept: LAB | Age: 50
End: 2025-08-08
Attending: FAMILY MEDICINE

## 2025-08-08 ENCOUNTER — RESULTS FOLLOW-UP (OUTPATIENT)
Dept: FAMILY MEDICINE CLINIC | Facility: CLINIC | Age: 50
End: 2025-08-08

## 2025-08-08 DIAGNOSIS — Z00.00 ANNUAL PHYSICAL EXAM: ICD-10-CM

## 2025-08-08 LAB
ALBUMIN SERPL-MCNC: 4.4 G/DL (ref 3.2–4.8)
ALBUMIN/GLOB SERPL: 1.8 (ref 1–2)
ALP LIVER SERPL-CCNC: 68 U/L (ref 45–117)
ALT SERPL-CCNC: 23 U/L (ref 10–49)
ANION GAP SERPL CALC-SCNC: 10 MMOL/L (ref 0–18)
AST SERPL-CCNC: 17 U/L (ref ?–34)
BASOPHILS # BLD AUTO: 0.03 X10(3) UL (ref 0–0.2)
BASOPHILS NFR BLD AUTO: 0.5 %
BILIRUB SERPL-MCNC: 0.6 MG/DL (ref 0.3–1.2)
BUN BLD-MCNC: 15 MG/DL (ref 9–23)
CALCIUM BLD-MCNC: 9.3 MG/DL (ref 8.7–10.6)
CHLORIDE SERPL-SCNC: 106 MMOL/L (ref 98–112)
CHOLEST SERPL-MCNC: 153 MG/DL (ref ?–200)
CO2 SERPL-SCNC: 25 MMOL/L (ref 21–32)
COMPLEXED PSA SERPL-MCNC: 0.49 NG/ML (ref ?–4)
CREAT BLD-MCNC: 1.07 MG/DL (ref 0.7–1.3)
CRP SERPL HS-MCNC: 7.69 MG/L (ref ?–3)
DEPRECATED HBV CORE AB SER IA-ACNC: 141 NG/ML (ref 50–336)
EGFRCR SERPLBLD CKD-EPI 2021: 84 ML/MIN/1.73M2 (ref 60–?)
EOSINOPHIL # BLD AUTO: 0.19 X10(3) UL (ref 0–0.7)
EOSINOPHIL NFR BLD AUTO: 3.1 %
ERYTHROCYTE [DISTWIDTH] IN BLOOD BY AUTOMATED COUNT: 12.7 %
EST. AVERAGE GLUCOSE BLD GHB EST-MCNC: 100 MG/DL (ref 68–126)
FASTING PATIENT LIPID ANSWER: YES
FASTING STATUS PATIENT QL REPORTED: YES
GLOBULIN PLAS-MCNC: 2.5 G/DL (ref 2–3.5)
GLUCOSE BLD-MCNC: 90 MG/DL (ref 70–99)
HBA1C MFR BLD: 5.1 % (ref ?–5.7)
HCT VFR BLD AUTO: 44 % (ref 39–53)
HDLC SERPL-MCNC: 49 MG/DL (ref 40–59)
HGB BLD-MCNC: 15 G/DL (ref 13–17.5)
IMM GRANULOCYTES # BLD AUTO: 0.01 X10(3) UL (ref 0–1)
IMM GRANULOCYTES NFR BLD: 0.2 %
INSULIN SERPL-ACNC: 10.8 MU/L (ref 3–25)
LDLC SERPL CALC-MCNC: 92 MG/DL (ref ?–100)
LYMPHOCYTES # BLD AUTO: 1.77 X10(3) UL (ref 1–4)
LYMPHOCYTES NFR BLD AUTO: 28.8 %
MCH RBC QN AUTO: 30 PG (ref 26–34)
MCHC RBC AUTO-ENTMCNC: 34.1 G/DL (ref 31–37)
MCV RBC AUTO: 88 FL (ref 80–100)
MONOCYTES # BLD AUTO: 0.46 X10(3) UL (ref 0.1–1)
MONOCYTES NFR BLD AUTO: 7.5 %
NEUTROPHILS # BLD AUTO: 3.68 X10 (3) UL (ref 1.5–7.7)
NEUTROPHILS # BLD AUTO: 3.68 X10(3) UL (ref 1.5–7.7)
NEUTROPHILS NFR BLD AUTO: 59.9 %
NONHDLC SERPL-MCNC: 104 MG/DL (ref ?–130)
OSMOLALITY SERPL CALC.SUM OF ELEC: 292 MOSM/KG (ref 275–295)
PLATELET # BLD AUTO: 317 10(3)UL (ref 150–450)
POTASSIUM SERPL-SCNC: 4.2 MMOL/L (ref 3.5–5.1)
PROT SERPL-MCNC: 6.9 G/DL (ref 5.7–8.2)
RBC # BLD AUTO: 5 X10(6)UL (ref 4.3–5.7)
SODIUM SERPL-SCNC: 141 MMOL/L (ref 136–145)
T4 FREE SERPL-MCNC: 1.5 NG/DL (ref 0.8–1.7)
TRIGL SERPL-MCNC: 56 MG/DL (ref 30–149)
TSI SER-ACNC: 0.88 UIU/ML (ref 0.55–4.78)
VIT B12 SERPL-MCNC: 1040 PG/ML (ref 211–911)
VIT D+METAB SERPL-MCNC: 42.7 NG/ML (ref 30–100)
VLDLC SERPL CALC-MCNC: 9 MG/DL (ref 0–30)
WBC # BLD AUTO: 6.1 X10(3) UL (ref 4–11)

## 2025-08-08 PROCEDURE — 83525 ASSAY OF INSULIN: CPT | Performed by: FAMILY MEDICINE

## 2025-08-08 PROCEDURE — 86141 C-REACTIVE PROTEIN HS: CPT | Performed by: FAMILY MEDICINE

## 2025-08-08 PROCEDURE — 82728 ASSAY OF FERRITIN: CPT | Performed by: FAMILY MEDICINE

## 2025-08-08 PROCEDURE — 83695 ASSAY OF LIPOPROTEIN(A): CPT | Performed by: FAMILY MEDICINE

## 2025-08-08 PROCEDURE — 82306 VITAMIN D 25 HYDROXY: CPT | Performed by: FAMILY MEDICINE

## 2025-08-08 PROCEDURE — 84403 ASSAY OF TOTAL TESTOSTERONE: CPT | Performed by: FAMILY MEDICINE

## 2025-08-08 PROCEDURE — 84439 ASSAY OF FREE THYROXINE: CPT | Performed by: FAMILY MEDICINE

## 2025-08-08 PROCEDURE — 82607 VITAMIN B-12: CPT | Performed by: FAMILY MEDICINE

## 2025-08-08 PROCEDURE — 80061 LIPID PANEL: CPT | Performed by: FAMILY MEDICINE

## 2025-08-08 PROCEDURE — 82172 ASSAY OF APOLIPOPROTEIN: CPT | Performed by: FAMILY MEDICINE

## 2025-08-08 PROCEDURE — 84153 ASSAY OF PSA TOTAL: CPT | Performed by: FAMILY MEDICINE

## 2025-08-08 PROCEDURE — 83036 HEMOGLOBIN GLYCOSYLATED A1C: CPT | Performed by: FAMILY MEDICINE

## 2025-08-08 PROCEDURE — 80050 GENERAL HEALTH PANEL: CPT | Performed by: FAMILY MEDICINE

## 2025-08-08 PROCEDURE — 84402 ASSAY OF FREE TESTOSTERONE: CPT | Performed by: FAMILY MEDICINE

## 2025-08-11 LAB
APOLIPOPROTEIN B: 84 MG/DL
LIPOPROTEIN (A): 20.6 NMOL/L

## 2025-08-15 LAB
FREE TESTOST DIRECT: 8.7 PG/ML
TESTOSTERONE: 353 NG/DL

## 2025-08-17 ENCOUNTER — HOSPITAL ENCOUNTER (OUTPATIENT)
Dept: CT IMAGING | Age: 50
Discharge: HOME | End: 2025-08-17
Attending: FAMILY MEDICINE

## 2025-08-17 DIAGNOSIS — Z13.6 SCREENING FOR CARDIOVASCULAR CONDITION: ICD-10-CM

## (undated) DIAGNOSIS — Z13.6 SCREENING FOR CARDIOVASCULAR CONDITION: Primary | ICD-10-CM

## (undated) DEVICE — Device: Brand: DEFENDO AIR/WATER/SUCTION AND BIOPSY VALVE

## (undated) DEVICE — 3M™ RED DOT™ MONITORING ELECTRODE WITH FOAM TAPE AND STICKY GEL, 50/BAG, 20/CASE, 72/PLT 2570: Brand: RED DOT™

## (undated) DEVICE — 1200CC GUARDIAN II: Brand: GUARDIAN

## (undated) DEVICE — ENDOSCOPY PACK - LOWER: Brand: MEDLINE INDUSTRIES, INC.

## (undated) DEVICE — FILTERLINE NASAL ADULT O2/CO2